# Patient Record
Sex: FEMALE | Race: WHITE | NOT HISPANIC OR LATINO | Employment: FULL TIME | URBAN - METROPOLITAN AREA
[De-identification: names, ages, dates, MRNs, and addresses within clinical notes are randomized per-mention and may not be internally consistent; named-entity substitution may affect disease eponyms.]

---

## 2017-01-06 ENCOUNTER — ANESTHESIA (OUTPATIENT)
Dept: LABOR AND DELIVERY | Facility: HOSPITAL | Age: 27
End: 2017-01-06

## 2017-01-06 ENCOUNTER — HOSPITAL ENCOUNTER (INPATIENT)
Facility: HOSPITAL | Age: 27
LOS: 2 days | Discharge: HOME | End: 2017-01-08
Attending: OBSTETRICS & GYNECOLOGY | Admitting: OBSTETRICS & GYNECOLOGY

## 2017-01-06 ENCOUNTER — ANESTHESIA EVENT (OUTPATIENT)
Dept: LABOR AND DELIVERY | Facility: HOSPITAL | Age: 27
End: 2017-01-06

## 2017-01-06 DIAGNOSIS — Z30.2 REQUEST FOR STERILIZATION: ICD-10-CM

## 2017-01-06 LAB
ABO GROUP BLD: NORMAL
ATMOSPHERIC PRESS: 758.8 MMHG
BASE EXCESS BLDCOA CALC-SCNC: -1.7 MMOL/L
BDY SITE: ABNORMAL
BLD GP AB SCN SERPL QL: NEGATIVE
DEPRECATED RDW RBC AUTO: 50.5 FL (ref 37–54)
ERYTHROCYTE [DISTWIDTH] IN BLOOD BY AUTOMATED COUNT: 16.3 % (ref 11.7–13)
GAS FLOW AIRWAY: 2 LPM
HCO3 BLDCOA-SCNC: 27.6 MMOL/L (ref 22–28)
HCT VFR BLD AUTO: 34.5 % (ref 35.6–45.5)
HGB BLD-MCNC: 11 G/DL (ref 11.9–15.5)
MCH RBC QN AUTO: 27.6 PG (ref 26.9–32)
MCHC RBC AUTO-ENTMCNC: 31.9 G/DL (ref 32.4–36.3)
MCV RBC AUTO: 86.7 FL (ref 80.5–98.2)
MODALITY: ABNORMAL
PCO2 BLDCOA: 64.5 MMHG
PH BLDCOA: 7.24 [PH] (ref 7.18–7.34)
PLATELET # BLD AUTO: 259 10*3/MM3 (ref 140–500)
PMV BLD AUTO: 9.2 FL (ref 6–12)
PO2 BLDCOA: <25.1 MMHG (ref 12–26)
RBC # BLD AUTO: 3.98 10*6/MM3 (ref 3.9–5.2)
RH BLD: POSITIVE
SAO2 % BLDCOA: 4.9 % (ref 92–99)
WBC NRBC COR # BLD: 6.93 10*3/MM3 (ref 4.5–10.7)

## 2017-01-06 PROCEDURE — 85027 COMPLETE CBC AUTOMATED: CPT | Performed by: OBSTETRICS & GYNECOLOGY

## 2017-01-06 PROCEDURE — 86901 BLOOD TYPING SEROLOGIC RH(D): CPT

## 2017-01-06 PROCEDURE — 25010000002 ONDANSETRON PER 1 MG: Performed by: ANESTHESIOLOGY

## 2017-01-06 PROCEDURE — 86900 BLOOD TYPING SEROLOGIC ABO: CPT

## 2017-01-06 PROCEDURE — 25010000003 CEFAZOLIN IN DEXTROSE 2-4 GM/100ML-% SOLUTION: Performed by: OBSTETRICS & GYNECOLOGY

## 2017-01-06 PROCEDURE — 88302 TISSUE EXAM BY PATHOLOGIST: CPT | Performed by: OBSTETRICS & GYNECOLOGY

## 2017-01-06 PROCEDURE — 94799 UNLISTED PULMONARY SVC/PX: CPT

## 2017-01-06 PROCEDURE — 25010000002 PROMETHAZINE PER 50 MG: Performed by: ANESTHESIOLOGY

## 2017-01-06 PROCEDURE — 63710000001 DIPHENHYDRAMINE PER 50 MG: Performed by: OBSTETRICS & GYNECOLOGY

## 2017-01-06 PROCEDURE — 25010000002 PHENYLEPHRINE PER 1 ML: Performed by: ANESTHESIOLOGY

## 2017-01-06 PROCEDURE — 25010000002 MORPHINE PER 10 MG: Performed by: ANESTHESIOLOGY

## 2017-01-06 PROCEDURE — 86850 RBC ANTIBODY SCREEN: CPT

## 2017-01-06 PROCEDURE — 82803 BLOOD GASES ANY COMBINATION: CPT

## 2017-01-06 RX ORDER — SIMETHICONE 80 MG
80 TABLET,CHEWABLE ORAL 4 TIMES DAILY PRN
Status: DISCONTINUED | OUTPATIENT
Start: 2017-01-06 | End: 2017-01-08 | Stop reason: HOSPADM

## 2017-01-06 RX ORDER — METHYLERGONOVINE MALEATE 0.2 MG/ML
200 INJECTION INTRAVENOUS ONCE AS NEEDED
Status: DISCONTINUED | OUTPATIENT
Start: 2017-01-06 | End: 2017-01-06 | Stop reason: HOSPADM

## 2017-01-06 RX ORDER — MAGNESIUM HYDROXIDE/ALUMINUM HYDROXICE/SIMETHICONE 120; 1200; 1200 MG/30ML; MG/30ML; MG/30ML
30 SUSPENSION ORAL EVERY 4 HOURS PRN
Status: DISCONTINUED | OUTPATIENT
Start: 2017-01-06 | End: 2017-01-08 | Stop reason: HOSPADM

## 2017-01-06 RX ORDER — BISACODYL 10 MG
10 SUPPOSITORY, RECTAL RECTAL DAILY PRN
Status: DISCONTINUED | OUTPATIENT
Start: 2017-01-06 | End: 2017-01-08 | Stop reason: HOSPADM

## 2017-01-06 RX ORDER — LANOLIN 100 %
OINTMENT (GRAM) TOPICAL
Status: DISCONTINUED | OUTPATIENT
Start: 2017-01-06 | End: 2017-01-08 | Stop reason: HOSPADM

## 2017-01-06 RX ORDER — FAMOTIDINE 10 MG/ML
20 INJECTION, SOLUTION INTRAVENOUS ONCE AS NEEDED
Status: COMPLETED | OUTPATIENT
Start: 2017-01-06 | End: 2017-01-06

## 2017-01-06 RX ORDER — IBUPROFEN 800 MG/1
800 TABLET ORAL EVERY 8 HOURS PRN
Status: DISCONTINUED | OUTPATIENT
Start: 2017-01-06 | End: 2017-01-08 | Stop reason: HOSPADM

## 2017-01-06 RX ORDER — NALOXONE HCL 0.4 MG/ML
0.4 VIAL (ML) INJECTION
Status: DISCONTINUED | OUTPATIENT
Start: 2017-01-06 | End: 2017-01-08 | Stop reason: HOSPADM

## 2017-01-06 RX ORDER — OXYTOCIN/RINGER'S LACTATE 10/500ML
125 PLASTIC BAG, INJECTION (ML) INTRAVENOUS CONTINUOUS PRN
Status: ACTIVE | OUTPATIENT
Start: 2017-01-06 | End: 2017-01-07

## 2017-01-06 RX ORDER — PROMETHAZINE HYDROCHLORIDE 25 MG/ML
12.5 INJECTION, SOLUTION INTRAMUSCULAR; INTRAVENOUS EVERY 6 HOURS PRN
Status: DISCONTINUED | OUTPATIENT
Start: 2017-01-06 | End: 2017-01-08 | Stop reason: HOSPADM

## 2017-01-06 RX ORDER — SODIUM CHLORIDE, SODIUM LACTATE, POTASSIUM CHLORIDE, CALCIUM CHLORIDE 600; 310; 30; 20 MG/100ML; MG/100ML; MG/100ML; MG/100ML
125 INJECTION, SOLUTION INTRAVENOUS CONTINUOUS
Status: DISCONTINUED | OUTPATIENT
Start: 2017-01-06 | End: 2017-01-08 | Stop reason: HOSPADM

## 2017-01-06 RX ORDER — MIRTAZAPINE 30 MG/1
30 TABLET, FILM COATED ORAL NIGHTLY
Status: DISCONTINUED | OUTPATIENT
Start: 2017-01-07 | End: 2017-01-08 | Stop reason: HOSPADM

## 2017-01-06 RX ORDER — CEFAZOLIN SODIUM 2 G/100ML
2 INJECTION, SOLUTION INTRAVENOUS ONCE
Status: COMPLETED | OUTPATIENT
Start: 2017-01-06 | End: 2017-01-06

## 2017-01-06 RX ORDER — ONDANSETRON 4 MG/1
4 TABLET, ORALLY DISINTEGRATING ORAL EVERY 6 HOURS PRN
Status: DISCONTINUED | OUTPATIENT
Start: 2017-01-06 | End: 2017-01-08 | Stop reason: HOSPADM

## 2017-01-06 RX ORDER — SODIUM CHLORIDE 0.9 % (FLUSH) 0.9 %
1-10 SYRINGE (ML) INJECTION AS NEEDED
Status: DISCONTINUED | OUTPATIENT
Start: 2017-01-06 | End: 2017-01-08 | Stop reason: HOSPADM

## 2017-01-06 RX ORDER — HYDROXYZINE 50 MG/1
50 TABLET, FILM COATED ORAL EVERY 6 HOURS PRN
Status: DISCONTINUED | OUTPATIENT
Start: 2017-01-06 | End: 2017-01-08 | Stop reason: HOSPADM

## 2017-01-06 RX ORDER — DIPHENHYDRAMINE HCL 25 MG
25 CAPSULE ORAL EVERY 4 HOURS PRN
Status: DISCONTINUED | OUTPATIENT
Start: 2017-01-06 | End: 2017-01-08 | Stop reason: HOSPADM

## 2017-01-06 RX ORDER — MORPHINE SULFATE 2 MG/ML
2 INJECTION, SOLUTION INTRAMUSCULAR; INTRAVENOUS
Status: ACTIVE | OUTPATIENT
Start: 2017-01-06 | End: 2017-01-07

## 2017-01-06 RX ORDER — CARBOPROST TROMETHAMINE 250 UG/ML
250 INJECTION, SOLUTION INTRAMUSCULAR AS NEEDED
Status: DISCONTINUED | OUTPATIENT
Start: 2017-01-06 | End: 2017-01-06 | Stop reason: HOSPADM

## 2017-01-06 RX ORDER — ONDANSETRON 2 MG/ML
4 INJECTION INTRAMUSCULAR; INTRAVENOUS EVERY 6 HOURS PRN
Status: DISCONTINUED | OUTPATIENT
Start: 2017-01-06 | End: 2017-01-08 | Stop reason: HOSPADM

## 2017-01-06 RX ORDER — OXYTOCIN/RINGER'S LACTATE 10/500ML
PLASTIC BAG, INJECTION (ML) INTRAVENOUS AS NEEDED
Status: DISCONTINUED | OUTPATIENT
Start: 2017-01-06 | End: 2017-01-06 | Stop reason: SURG

## 2017-01-06 RX ORDER — ONDANSETRON 2 MG/ML
4 INJECTION INTRAMUSCULAR; INTRAVENOUS ONCE AS NEEDED
Status: DISCONTINUED | OUTPATIENT
Start: 2017-01-06 | End: 2017-01-08 | Stop reason: HOSPADM

## 2017-01-06 RX ORDER — BUPIVACAINE HYDROCHLORIDE 7.5 MG/ML
INJECTION, SOLUTION EPIDURAL; RETROBULBAR AS NEEDED
Status: DISCONTINUED | OUTPATIENT
Start: 2017-01-06 | End: 2017-01-06 | Stop reason: SURG

## 2017-01-06 RX ORDER — ONDANSETRON 4 MG/1
4 TABLET, FILM COATED ORAL EVERY 6 HOURS PRN
Status: DISCONTINUED | OUTPATIENT
Start: 2017-01-06 | End: 2017-01-08 | Stop reason: HOSPADM

## 2017-01-06 RX ORDER — DIPHENHYDRAMINE HYDROCHLORIDE 50 MG/ML
25 INJECTION INTRAMUSCULAR; INTRAVENOUS EVERY 4 HOURS PRN
Status: DISCONTINUED | OUTPATIENT
Start: 2017-01-06 | End: 2017-01-08 | Stop reason: HOSPADM

## 2017-01-06 RX ORDER — ONDANSETRON 2 MG/ML
4 INJECTION INTRAMUSCULAR; INTRAVENOUS ONCE AS NEEDED
Status: COMPLETED | OUTPATIENT
Start: 2017-01-06 | End: 2017-01-06

## 2017-01-06 RX ORDER — NALOXONE HCL 0.4 MG/ML
0.2 VIAL (ML) INJECTION
Status: DISCONTINUED | OUTPATIENT
Start: 2017-01-06 | End: 2017-01-08 | Stop reason: HOSPADM

## 2017-01-06 RX ORDER — PROMETHAZINE HYDROCHLORIDE 25 MG/ML
INJECTION, SOLUTION INTRAMUSCULAR; INTRAVENOUS AS NEEDED
Status: DISCONTINUED | OUTPATIENT
Start: 2017-01-06 | End: 2017-01-06 | Stop reason: SURG

## 2017-01-06 RX ORDER — MISOPROSTOL 200 UG/1
800 TABLET ORAL AS NEEDED
Status: DISCONTINUED | OUTPATIENT
Start: 2017-01-06 | End: 2017-01-06 | Stop reason: HOSPADM

## 2017-01-06 RX ORDER — OXYCODONE HYDROCHLORIDE AND ACETAMINOPHEN 5; 325 MG/1; MG/1
1 TABLET ORAL EVERY 4 HOURS PRN
Status: DISCONTINUED | OUTPATIENT
Start: 2017-01-06 | End: 2017-01-08 | Stop reason: HOSPADM

## 2017-01-06 RX ORDER — OXYTOCIN/RINGER'S LACTATE 10/500ML
999 PLASTIC BAG, INJECTION (ML) INTRAVENOUS ONCE
Status: DISCONTINUED | OUTPATIENT
Start: 2017-01-06 | End: 2017-01-08 | Stop reason: HOSPADM

## 2017-01-06 RX ORDER — OXYTOCIN/RINGER'S LACTATE 10/500ML
999 PLASTIC BAG, INJECTION (ML) INTRAVENOUS ONCE
Status: DISCONTINUED | OUTPATIENT
Start: 2017-01-06 | End: 2017-01-06 | Stop reason: HOSPADM

## 2017-01-06 RX ORDER — MORPHINE SULFATE 2 MG/ML
2 INJECTION, SOLUTION INTRAMUSCULAR; INTRAVENOUS EVERY 4 HOURS PRN
Status: DISCONTINUED | OUTPATIENT
Start: 2017-01-06 | End: 2017-01-08 | Stop reason: HOSPADM

## 2017-01-06 RX ORDER — FAMOTIDINE 20 MG/1
20 TABLET, FILM COATED ORAL DAILY
Status: DISCONTINUED | OUTPATIENT
Start: 2017-01-07 | End: 2017-01-08 | Stop reason: HOSPADM

## 2017-01-06 RX ORDER — OXYTOCIN/RINGER'S LACTATE 10/500ML
125 PLASTIC BAG, INJECTION (ML) INTRAVENOUS CONTINUOUS PRN
Status: DISCONTINUED | OUTPATIENT
Start: 2017-01-06 | End: 2017-01-06 | Stop reason: HOSPADM

## 2017-01-06 RX ORDER — PROMETHAZINE HYDROCHLORIDE 25 MG/1
25 TABLET ORAL EVERY 6 HOURS PRN
Status: DISCONTINUED | OUTPATIENT
Start: 2017-01-06 | End: 2017-01-08 | Stop reason: HOSPADM

## 2017-01-06 RX ORDER — ACETAMINOPHEN 500 MG
1000 TABLET ORAL ONCE
Status: COMPLETED | OUTPATIENT
Start: 2017-01-06 | End: 2017-01-06

## 2017-01-06 RX ORDER — ACETAMINOPHEN 325 MG/1
650 TABLET ORAL EVERY 4 HOURS PRN
Status: DISCONTINUED | OUTPATIENT
Start: 2017-01-06 | End: 2017-01-08 | Stop reason: HOSPADM

## 2017-01-06 RX ORDER — MISOPROSTOL 200 UG/1
600 TABLET ORAL ONCE AS NEEDED
Status: DISCONTINUED | OUTPATIENT
Start: 2017-01-06 | End: 2017-01-08 | Stop reason: HOSPADM

## 2017-01-06 RX ORDER — OXYCODONE AND ACETAMINOPHEN 7.5; 325 MG/1; MG/1
1 TABLET ORAL EVERY 4 HOURS PRN
Status: DISCONTINUED | OUTPATIENT
Start: 2017-01-06 | End: 2017-01-08 | Stop reason: HOSPADM

## 2017-01-06 RX ORDER — HYDROMORPHONE HYDROCHLORIDE 1 MG/ML
0.25 INJECTION, SOLUTION INTRAMUSCULAR; INTRAVENOUS; SUBCUTANEOUS
Status: DISCONTINUED | OUTPATIENT
Start: 2017-01-06 | End: 2017-01-06 | Stop reason: HOSPADM

## 2017-01-06 RX ORDER — DOCUSATE SODIUM 100 MG/1
100 CAPSULE, LIQUID FILLED ORAL 2 TIMES DAILY PRN
Status: DISCONTINUED | OUTPATIENT
Start: 2017-01-06 | End: 2017-01-08 | Stop reason: HOSPADM

## 2017-01-06 RX ORDER — MORPHINE SULFATE 1 MG/ML
INJECTION, SOLUTION EPIDURAL; INTRATHECAL; INTRAVENOUS AS NEEDED
Status: DISCONTINUED | OUTPATIENT
Start: 2017-01-06 | End: 2017-01-06 | Stop reason: SURG

## 2017-01-06 RX ORDER — PROMETHAZINE HYDROCHLORIDE 12.5 MG/1
12.5 SUPPOSITORY RECTAL EVERY 6 HOURS PRN
Status: DISCONTINUED | OUTPATIENT
Start: 2017-01-06 | End: 2017-01-08 | Stop reason: HOSPADM

## 2017-01-06 RX ADMIN — PROMETHAZINE HYDROCHLORIDE 6.25 MG: 25 INJECTION INTRAMUSCULAR; INTRAVENOUS at 14:07

## 2017-01-06 RX ADMIN — OXYCODONE HYDROCHLORIDE AND ACETAMINOPHEN 1 TABLET: 5; 325 TABLET ORAL at 17:51

## 2017-01-06 RX ADMIN — OXYCODONE HYDROCHLORIDE AND ACETAMINOPHEN 1 TABLET: 5; 325 TABLET ORAL at 22:43

## 2017-01-06 RX ADMIN — PHENYLEPHRINE HYDROCHLORIDE 100 MCG: 10 INJECTION INTRAVENOUS at 13:58

## 2017-01-06 RX ADMIN — EPHEDRINE SULFATE 10 MG: 50 INJECTION INTRAMUSCULAR; INTRAVENOUS; SUBCUTANEOUS at 13:46

## 2017-01-06 RX ADMIN — CEFAZOLIN SODIUM 2 G: 2 INJECTION, SOLUTION INTRAVENOUS at 13:15

## 2017-01-06 RX ADMIN — PHENYLEPHRINE HYDROCHLORIDE 100 MCG: 10 INJECTION INTRAVENOUS at 13:45

## 2017-01-06 RX ADMIN — PROMETHAZINE HYDROCHLORIDE 6.25 MG: 25 INJECTION INTRAMUSCULAR; INTRAVENOUS at 14:03

## 2017-01-06 RX ADMIN — PHENYLEPHRINE HYDROCHLORIDE 100 MCG: 10 INJECTION INTRAVENOUS at 14:06

## 2017-01-06 RX ADMIN — FAMOTIDINE 20 MG: 10 INJECTION, SOLUTION INTRAVENOUS at 13:05

## 2017-01-06 RX ADMIN — BUPIVACAINE HYDROCHLORIDE 1.6 ML: 7.5 INJECTION, SOLUTION EPIDURAL; RETROBULBAR at 13:35

## 2017-01-06 RX ADMIN — ONDANSETRON 4 MG: 2 INJECTION INTRAMUSCULAR; INTRAVENOUS at 13:04

## 2017-01-06 RX ADMIN — OXYTOCIN 500 ML: 10 INJECTION, SOLUTION INTRAMUSCULAR; INTRAVENOUS at 13:49

## 2017-01-06 RX ADMIN — SODIUM CHLORIDE, POTASSIUM CHLORIDE, SODIUM LACTATE AND CALCIUM CHLORIDE: 600; 310; 30; 20 INJECTION, SOLUTION INTRAVENOUS at 13:27

## 2017-01-06 RX ADMIN — OXYTOCIN 125 ML/HR: 10 INJECTION, SOLUTION INTRAMUSCULAR; INTRAVENOUS at 14:45

## 2017-01-06 RX ADMIN — MORPHINE SULFATE 0.2 MG: 1 INJECTION EPIDURAL; INTRATHECAL; INTRAVENOUS at 13:35

## 2017-01-06 RX ADMIN — HYDROXYZINE HYDROCHLORIDE 50 MG: 50 TABLET, FILM COATED ORAL at 20:07

## 2017-01-06 RX ADMIN — IBUPROFEN 800 MG: 800 TABLET ORAL at 16:26

## 2017-01-06 RX ADMIN — ACETAMINOPHEN 500 MG TABLET 1000 MG: at 13:07

## 2017-01-06 RX ADMIN — DIPHENHYDRAMINE HYDROCHLORIDE 25 MG: 25 CAPSULE ORAL at 16:22
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
## 2017-01-06 NOTE — ANESTHESIA PREPROCEDURE EVALUATION
Anesthesia Evaluation     Patient summary reviewed and Nursing notes reviewed    No history of anesthetic complications   Airway   Mallampati: II  TM distance: >3 FB  Neck ROM: full  no difficulty expected  Dental - normal exam     Pulmonary - normal exam    breath sounds clear to auscultation  (+) hx of smoking,   Cardiovascular - negative cardio ROS and normal exam    Rhythm: regular  Rate: normal    Neuro/Psych  (+) headaches, psychiatric history Bipolar,    GI/Hepatic/Renal/Endo - negative ROS     Musculoskeletal (-) negative ROS    Abdominal  - normal exam   Substance History - negative use     OB/GYN    (+) Pregnant,         Other - negative ROS                            Anesthesia Plan    ASA 2     spinal     Anesthetic plan and risks discussed with patient.
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
## 2017-01-06 NOTE — PLAN OF CARE
Problem: Patient Care Overview (Adult)  Goal: Plan of Care Review  Outcome: Ongoing (interventions implemented as appropriate)    17 1523   Coping/Psychosocial Response Interventions   Plan Of Care Reviewed With patient;spouse   Patient Care Overview   Progress progress toward functional goals as expected       Goal: Adult Individualization and Mutuality  Outcome: Ongoing (interventions implemented as appropriate)    17 1523   Individualization   Patient Specific Preferences Primary  for HSV   Patient Specific Goals Pain management    Mutuality/Individual Preferences   What Anxieties, Fears or Concerns Do You Have About Your Health or Care? Spinal       Goal: Discharge Needs Assessment  Outcome: Ongoing (interventions implemented as appropriate)    17 1523   Discharge Needs Assessment   Concerns To Be Addressed no discharge needs identified         Problem:  Delivery (Adult,Obstetrics,Pediatric)  Goal: Signs and Symptoms of Listed Potential Problems Will be Absent or Manageable ( Delivery)  Outcome: Outcome(s) achieved Date Met:  17 1523    Delivery   Problems Assessed ( Delivery) all   Problems Present ( Delivery) fetal well-being change

## 2017-01-06 NOTE — ANESTHESIA POSTPROCEDURE EVALUATION
Patient: Rema Tee    Procedure Summary     Date Anesthesia Start Anesthesia Stop Room / Location    17 1321 [id number]  [facility] /  [facility]       Procedure Diagnosis Surgeon Provider     SECTION PRIMARY WITH TUBAL (N/A Abdomen) (pregnancy) MD Christiano Laurent MD          Anesthesia Type: spinal  Last vitals  BP 96/56 (17)    Temp 36 °C (96.8 °F) (17)    Pulse 76 (17)   Resp 16 (17)    SpO2 98 % (17)      Post Anesthesia Care and Evaluation    Patient location during evaluation: bedside  Patient participation: complete - patient participated  Level of consciousness: awake and alert  Pain management: adequate  Airway patency: patent  Anesthetic complications: No anesthetic complications  Cardiovascular status: acceptable  Respiratory status: acceptable  Hydration status: acceptable

## 2017-01-06 NOTE — OP NOTE
[facility]   Section Operative Note    Patient Name: Rema Tee  :  1990  MRN:  [id number]      Date of procedure:  2017        Pre-Operative Dx:   1.  IUP at 38w0d weeks   2. Other (Add Comments)         Postoperative Dx:  Same        Procedure: , Low Transverse    Surgeon: Jaylin Laureano MD   Assistant: Christoph Hanna MD   Anesthesia: Spinal    EBL: 800 ml               Findings:                           Amniotic Fluid clear  Uterus normal  Tubes and ovaries normal bilaterally              Infant:      Infant's Name: erinn     Gender: Viable female  infant     Weight: 6 lb 7.5 oz (2.935 kg)     Apgars: 8   @ 1 minute /     9   @ 5 minutes                 Indication for C/Section:     Other (Add Comments)      HSV          Procedure Details:  The patient was taken to the operating room where spinal anesthesia was found to be adequate. She was prepped and draped in the usual sterile manner in the dorsal supine position with leftward tilt. A Pfannenstiel incision was made and carried down to the fascia. The fascia was incised in the mid-line and extended transversely with Gonzalez scissors. The fascia was grasped and elevated and  from the underlying rectus muscles superiorly and inferiorly. The peritoneum was identified and entered. Peritoneal incision was extended superiorly and inferiorly with good visualization of the bladder. The bladder blade was inserted and the vesicouterine peritoneum was incised transversely and the bladder flap was created digitally. The bladder blade was reinserted. The  lower uterine segment was incised in a transverse fashion.  The fetal head was elevated and delivered through the incision. The remainder of the infant was delivered without difficulty. The umbilical cord was clamped and cut and the infant was handed off the field. Cord ph and cord bloods were obtained. The placenta was removed intact and appeared normal. The uterine incision  was closed with running locked sutures of 0 chromic. Second layer closure was placed imbricating the first for hemostasis. The abdominal cavity was irrigated and cleared of all clot and debris. The uterine incision was inspected and noted to be hemostatic. Rectus muscles were reapproximated in the midline with 0 chromic.  The fascia was closed with 0 PDS in running continuous fashion. The subcutaneous tissue was closed with 3-0 vicryl. The skin was closed in subcuticular fashion with 4-0 Vicryl.   Instrument, sponge, and needle counts were correct prior the abdominal closure and at the conclusion of the case. Mother and baby  to recovery room in stable condition.              Complications:     None          Antibiotics: cefazolin (Ancef)                      Jaylin Laureano MD  1/6/2017  2:47 PM    There was a true knot in the cord.  Jaylin Laureano MD

## 2017-01-06 NOTE — ANESTHESIA PROCEDURE NOTES
Intrathecal Block    Patient location during procedure: OB  Start Time: 1/6/2017 1:34 PM  Indication:at surgeon's request  Performed By  Anesthesiologist: FAWAD MAJOR  Intrathecal Block Prep:  Pt Position:sitting  Sterile Tech:cap, gloves, mask and sterile barrier  Intrathecal Block Procedure:  Approach:midline  Guidance:landmark technique  Location:L3-L4  Needle Type:Sprotte  Needle Gauge:24 G  Placement of Needle Event: cerebrospinal fluid  Fluid Appearance:clear  Post Assessment:  Patient Tolerance:patient tolerated the procedure well with no apparent complications  Complications:no

## 2017-01-06 NOTE — IP AVS SNAPSHOT
AFTER VISIT SUMMARY             Rema Tee           About your hospitalization     You were admitted on:  2017 You last received care in the:  [address]       Procedures & Surgeries      Procedure(s) (LRB):   SECTION PRIMARY WITH TUBAL (N/A)     2017     Surgeon(s):  Jaylin Laureano MD  -------------------      Medications    If you or your caregiver advised us that you are currently taking a medication and that medication is marked below as “Resume”, this simply indicates that we have reviewed those medications to make sure our new therapy recommendations do not interfere.  If you have concerns about medications other than those new ones which we are prescribing today, please consult the physician who prescribed them (or your primary physician).  Our review of your home medications is not meant to indicate that we are directing their use.             Your Medications      START taking these medications     ibuprofen 800 MG tablet   Take 1 tablet by mouth Every 8 (Eight) Hours As Needed for mild pain (1-3).   Last time this was given:  2017 11:08 AM   Commonly known as:  ADVIL,MOTRIN             CHANGE how you take these medications     oxyCODONE-acetaminophen 5-325 MG per tablet   Take 1 tablet by mouth Every 4 (Four) Hours As Needed for moderate pain (4-6).   Last time this was given:  2017 12:44 PM   Commonly known as:  PERCOCET   What changed:  Another medication with the same name was added. Make sure you understand how and when to take each.           oxyCODONE-acetaminophen 5-325 MG per tablet   Take 1 tablet by mouth Every 4 (Four) Hours As Needed for moderate pain (4-6) for up to 8 days.   Last time this was given:  2017 12:44 PM   Commonly known as:  PERCOCET   What changed:  You were already taking a medication with the same name, and this prescription was added. Make sure you understand how and when to take each.             CONTINUE  taking these medications     famotidine 20 MG tablet   Take 1 tablet by mouth Daily.   Commonly known as:  PEPCID           prenatal vitamin 27-0.8 27-0.8 MG tablet tablet   Take  by mouth daily.           promethazine 25 MG tablet   Take 1 tablet by mouth every 6 (six) hours as needed for nausea or vomiting for up to 30 doses.   Commonly known as:  PHENERGAN           REMERON PO   Take 30 mg by mouth Daily.   Last time this was given:  1/7/2017  8:21 PM           VYVANSE 50 MG capsule   Take 50 mg by mouth Every Morning.   Generic drug:  lisdexamfetamine             STOP taking these medications     valACYclovir 1000 MG tablet   Commonly known as:  VALTREX                Where to Get Your Medications      These medications were sent to [facility] [address] [place], [place] [address] [facility] AT [facility] - [phone] [facility] [phone] [address] [address]    Hours:  24-hours Phone:  [phone]     ibuprofen 800 MG tablet         You can get these medications from any pharmacy     Bring a paper prescription for each of these medications     oxyCODONE-acetaminophen 5-325 MG per tablet                  Your Medications      Your Medication List           Morning Noon Evening Bedtime As Needed    famotidine 20 MG tablet   Take 1 tablet by mouth Daily.   Commonly known as:  PEPCID                                ibuprofen 800 MG tablet   Take 1 tablet by mouth Every 8 (Eight) Hours As Needed for mild pain (1-3).   Commonly known as:  ADVIL,MOTRIN                                * oxyCODONE-acetaminophen 5-325 MG per tablet   Take 1 tablet by mouth Every 4 (Four) Hours As Needed for moderate pain (4-6).   Commonly known as:  PERCOCET                                * oxyCODONE-acetaminophen 5-325 MG per tablet   Take 1 tablet by mouth Every 4 (Four) Hours As Needed for moderate pain (4-6) for up to 8 days.   Commonly known as:  PERCOCET                                prenatal  vitamin 27-0.8 27-0.8 MG tablet tablet   Take  by mouth daily.                                promethazine 25 MG tablet   Take 1 tablet by mouth every 6 (six) hours as needed for nausea or vomiting for up to 30 doses.   Commonly known as:  PHENERGAN                                REMERON PO   Take 30 mg by mouth Daily.                                VYVANSE 50 MG capsule   Take 50 mg by mouth Every Morning.   Generic drug:  lisdexamfetamine                                * Notice:  This list has 2 medication(s) that are the same as other medications prescribed for you. Read the directions carefully, and ask your doctor or other care provider to review them with you.             Instructions for After Discharge        Discharge References/Attachments     POSTPARTUM CARE AFTER  DELIVERY (ENGLISH)    WEANING (ENGLISH)    EXCLUSIVE BREASTFEEDING (ENGLISH)    BREASTFEEDING (ENGLISH)    BREAST ENGORGEMENT (ENGLISH)    BREASTFEEDING AND MASTITIS (ENGLISH)    ACETAMINOPHEN; OXYCODONE TABLETS (ENGLISH)    IBUPROFEN TABLETS AND CAPSULES (ENGLISH)    PRENATAL VITAMIN AND MINERAL COMBINATIONS (ORAL SOLID DOSAGE FORMS) (ENGLISH)    MIRTAZAPINE TABLETS (ENGLISH)       Follow-ups for After Discharge        [facility] [employer] Signup     Our records indicate that you have an active [employer] account.    You can view your After Visit Summary by going to [employer] and logging in with your [employer] username and password.  If you don't have a [employer] username and password but a parent or guardian has access to your record, the parent or guardian should login with their own [employer] username and password and access your record to view the After Visit Summary.    If you have questions, you can email [email or web address] or call [phone] to talk to our [employer] staff.  Remember, [employer] is NOT to be used for urgent needs.  For medical emergencies, dial 911.           Summary of Your Hospitalization         Reason for Hospitalization     Your primary diagnosis was:  Not on File    Your diagnoses also included:  Pregnancy      Care Providers     Provider Service Role Specialty    Jaylin Laureano MD -- Attending Provider Obstetrics and Gynecology    Laura Perdue MD -- Surgeon  Obstetrics and Gynecology    Jaylin Laureano MD -- Surgeon  Obstetrics and Gynecology      Your Allergies  Date Reviewed: 2017    No active allergies      Pending Labs     Order Current Status    Tissue Exam In process      Patient Belongings Returned     Document Return of Belongings Flowsheet     Were the patient bedside belongings sent home?   --   Belongings Retrieved from Security & Sent Home   --    Belongings Sent to Safe   --   Medications Retrieved from Pharmacy & Sent Home   --              More Information      Postpartum Care After  Delivery  After you deliver your  (postpartum period), the usual stay in the hospital is 24-72 hours. If there were problems with your labor or delivery, or if you have other medical problems, you might be in the hospital longer.   While you are in the hospital, you will receive help and instructions on how to care for yourself and your  during the postpartum period.   While you are in the hospital:  · It is normal for you to have pain or discomfort from the incision in your abdomen. Be sure to tell your nurses when you are having pain, where the pain is located, and what makes the pain worse.  · If you are breastfeeding, you may feel uncomfortable contractions of your uterus for a couple of weeks. This is normal. The contractions help your uterus get back to normal size.  · It is normal to have some bleeding after delivery.    For the first 1-3 days after delivery, the flow is red and the amount may be similar to a period.    It is common for the flow to start and stop.    In the first few days, you may pass some small clots. Let your nurses know if you begin to pass large  clots or your flow increases.    Do not  flush blood clots down the toilet before having the nurse look at them.    During the next 3-10 days after delivery, your flow should become more watery and pink or brown-tinged in color.    Ten to fourteen days after delivery, your flow should be a small amount of yellowish-white discharge.    The amount of your flow will decrease over the first few weeks after delivery. Your flow may stop in 6-8 weeks. Most women have had their flow stop by 12 weeks after delivery.  · You should change your sanitary pads frequently.  · Wash your hands thoroughly with soap and water for at least 20 seconds after changing pads, using the toilet, or before holding or feeding your .  · Your intravenous (IV) tubing will be removed when you are drinking enough fluids.  · The urine drainage tube (urinary catheter) that was inserted before delivery may be removed within 6-8 hours after delivery or when feeling returns to your legs. You should feel like you need to empty your bladder within the first 6-8 hours after the catheter has been removed.  · In case you become weak, lightheaded, or faint, call your nurse before you get out of bed for the first time and before you take a shower for the first time.  · Within the first few days after delivery, your breasts may begin to feel tender and full. This is called engorgement. Breast tenderness usually goes away within 48-72 hours after engorgement occurs. You may also notice milk leaking from your breasts. If you are not breastfeeding, do not stimulate your breasts. Breast stimulation can make your breasts produce more milk.  · Spending as much time as possible with your  is very important. During this time, you and your  can feel close and get to know each other. Having your  stay in your room (rooming in) will help to strengthen the bond with your . It will give you time to get to know your  and become  comfortable caring for your .  · Your hormones change after delivery. Sometimes the hormone changes can temporarily cause you to feel sad or tearful. These feelings should not last more than a few days. If these feelings last longer than that, you should talk to your caregiver.  · If desired, talk to your caregiver about methods of family planning or contraception.  · Talk to your caregiver about immunizations. Your caregiver may want you to have the following immunizations before leaving the hospital:    Tetanus, diphtheria, and pertussis (Tdap) or tetanus and diphtheria (Td) immunization. It is very important that you and your family (including grandparents) or others caring for your  are up-to-date with the Tdap or Td immunizations. The Tdap or Td immunization can help protect your  from getting ill.    Rubella immunization.    Varicella (chickenpox) immunization.    Influenza immunization. You should receive this annual immunization if you did not receive the immunization during your pregnancy.     This information is not intended to replace advice given to you by your health care provider. Make sure you discuss any questions you have with your health care provider.     Document Released: 2013 Document Reviewed: 2013  [employer] Interactive Patient Education ©6 [employer]          Weaning  When you stop breastfeeding, it is called weaning. This can be a natural process that takes place on its own over time. There may be a reason you need to stop breastfeeding before it can happen naturally (such as you may need to go back to work, be away from home on a trip, or you feel it is the right time). If possible, wait until your baby is at least 6 months old. With a little time and preparation, weaning can be a positive experience.   WHEN IS THE BEST TIME TO STOP BREASTFEEDING?  There is no right or wrong answer. What is best for you and your child may be different from what is best  for other mothers and their children. It is recommended to:  · Feed your baby only breast milk for the first 6 months.  · Feed your baby both breast milk and solid food for another 6 months.  · Continue to breastfeed your baby as long as both you and your child desire after 12 months.  HOW DO I START WEANING?  Wean gradually over several weeks. Some guidelines to follow are:  · Start by introducing iron-fortified, solid food in addition to breast milk.  · Encourage your baby to try different feeding methods.  · Teach your baby to drink from a cup. Try putting pre-pumped (expressed) breast milk in the cup.  · If your baby will not use a cup, try offering a bottle.  · It may be easier and less confusing for your baby if someone else offers the first cup or bottle feeding.  · When cup or bottle feeding is successful, and your baby is getting enough nutrition that way, you can substitute a cup feeding for a breastfeeding session.  · You can eventually substitute a cup feeding for another breastfeeding session, especially if your baby will drink something besides your expressed breast milk.  · Continue replacing one more breastfeeding session every few days.  · Your breasts may feel full and uncomfortable at times during weaning. Express just a small amount of milk for relief.  HOW DOES BREASTFEEDING STOP NATURALLY?  Children may start to wean themselves at about 6 months. This may happen when:  · You introduce solid food. Your baby may still prefer to nurse in order to get fluids.  · Your baby is better able to drink from a cup. This may prompt your baby to breastfeed less often.  · Your baby gradually becomes less interested in breastfeeding as he or she gets used to drinking other fluids.  · Your baby slowly starts to drop one breastfeeding session every 2-3 days.  WHEN SHOULD I GET HELP WITH WEANING?  Your health care provider or a lactation consultant can help you during the weaning process. They are good resources  to ask which foods are best to introduce first and which fluids you can start substituting for breast milk. They can also help if you are having any problems related to weaning.  WHEN SHOULD I CONTACT MY HEALTH CARE PROVIDER?  You should contact your health care provider if:  · Your baby is not gaining weight.  · You baby suddenly stops nursing.  · One or both breasts become firm and painful.     This information is not intended to replace advice given to you by your health care provider. Make sure you discuss any questions you have with your health care provider.     Document Released: 04/18/2006 Document Revised: 12/23/2014 Document Reviewed: 10/14/2014  [employer] Interactive Patient Education ©2016 [employer]          Exclusive Breastfeeding  Deciding to breastfeed is one of the best choices you can make for you and your baby. Breast milk has all the nutrients that your baby needs, when your baby needs them. That is why many women who can choose to breastfeed exclusively.  A change in hormones during pregnancy causes your breast tissue to grow and increases the number and size of your milk ducts. These hormones also allow proteins, sugars, and fats from your blood supply to make breast milk in your milk-producing glands. Hormones prevent breast milk from being released before your baby is born as well as prompt milk flow after birth. Once breastfeeding has begun, thoughts of your baby, as well as his or her sucking or crying, can stimulate the release of milk from your milk-producing glands.   BENEFITS OF BREASTFEEDING  For Your Baby  · Your first milk (colostrum) helps your baby's digestive system function better.  · There are antibodies in your milk that help your baby fight off infections.  · Your baby has a lower incidence of asthma, allergies, and sudden infant death syndrome.  · The nutrients in breast milk are better for your baby than infant formulas and are designed uniquely for your baby's  needs.  · Breast milk improves your baby's brain development.  · Your baby is less likely to develop other conditions, such as childhood obesity, asthma, or type 2 diabetes mellitus.  For You   · Breastfeeding helps to create a very special bond between you and your baby.  · Breastfeeding is convenient. Breast milk is always available at the correct temperature and costs nothing.  · Breastfeeding helps to burn calories and helps you lose the weight gained during pregnancy.  · Breastfeeding makes your uterus contract to its prepregnancy size faster and slows bleeding (lochia) after you give birth.  · Breastfeeding helps to lower your risk of developing type 2 diabetes mellitus, osteoporosis, and breast or ovarian cancer later in life.  BENEFITS OF EXCLUSIVE BREASTFEEDING  · The more often your baby breastfeeds, the more milk you will produce. By exclusively breastfeeding, you help to ensure that your baby has an ample supply of milk. Supplementing with water or infant formula when your baby would otherwise be breastfeeding may send a message to your body that less milk is needed. Your baby may become full from the supplements and breastfeed less. This can interfere with the establishment of your milk supply.  · Exclusive breastfeeding helps to develop your baby's immune system in a healthy way. Your breast milk contains antibodies your baby needs to to fight off germs that cause infections.  babies are also less likely to develop food allergies. When your baby is introduced to foods other than breast milk too soon, his or her body is more likely to interpret that food as an allergen, making antibodies against it. This is what causes food allergies. If your baby breastfeeds less because he or she is also receiving supplements, he or she may have a harder time fighting infections or be more likely to develop food allergies.  SIGNS THAT YOUR BABY IS HUNGRY  Early Signs of Hunger   · Increased alertness or  activity.  · Stretching.  · Movement of the head from side to side.  · Movement of the head and opening of the mouth when the corner of the mouth or cheek is stroked (rooting).  · Increased sucking sounds, smacking lips, cooing, sighing, or squeaking.  · Hand-to-mouth movements.  · Increased sucking of fingers or hands.  Late Signs of Hunger  · Fussing.  · Intermittent crying.  Extreme Signs of Hunger  Signs of extreme hunger will require calming and consoling before your baby will be able to breastfeed successfully. Do not wait for the following signs of extreme hunger to occur before you initiate breastfeeding:   · Restlessness.  · A loud, strong cry.  · Screaming.  BREASTFEEDING BASICS  Breastfeeding Initiation  · Find a comfortable place to sit or lie down, with your neck and back well supported.  · Place a pillow or rolled up blanket under your baby to bring him or her to the level of your breast (if you are seated). Nursing pillows are specially designed to help support your arms and your baby while you breastfeed.  · Make sure that your baby's abdomen is facing your abdomen.  · Gently massage your breast. With your fingertips, massage from your chest wall toward your nipple in a circular motion. This encourages milk flow. You may need to continue this action during the feeding if your milk flows slowly.  · Support your breast with 4 fingers underneath and your thumb above your nipple. Make sure your fingers are well away from your nipple and your baby's mouth.  · Stroke your baby's lips gently with your finger or nipple.  · When your baby's mouth is open wide enough, quickly bring your baby to your breast, placing your entire nipple and as much of the colored area around your nipple (areola) as possible into your baby's mouth.  ¨ More areola should be visible above your baby's upper lip than below the lower lip.  ¨ Your baby's tongue should be between his or her lower gum and your breast.  · Ensure that your  baby's mouth is correctly positioned around your nipple (latched). Your baby's lips should create a seal on your breast and be turned out (everted).  · It is common for your baby to suck for about 2-3 minutes in order to start the flow of breast milk.  Latching  Teaching your baby how to latch on to your breast properly is very important. An improper latch can cause nipple pain and decreased milk supply for you and poor weight gain in your baby. Also, if your baby is not latched onto your nipple properly, he or she may swallow some air during feeding. This can make your baby fussy. Burping your baby when you switch breasts during the feeding can help to get rid of the air. However, teaching your baby to latch on properly is still the best way to prevent your baby from swallowing air while breastfeeding.  Signs that your baby has successfully latched on to your nipple:   · Silent tugging or silent sucking, without causing you pain.  · Swallowing heard between every 3-4 sucks.  · Muscle movement above and in front of his or her ears while sucking.  Signs that your baby has not successfully latched on to your nipple:   · Sucking sounds or smacking sounds from your baby while breastfeeding.  · Nipple pain.  If you think your baby has not latched on correctly, slip your finger into the corner of your baby's mouth to break the suction and place it between your baby's gums. Attempt breastfeeding initiation again.  Signs of Successful Breastfeeding  Signs from your baby:   · A gradual decrease in the number of sucks or complete cessation of sucking.  · Falling asleep.  · Relaxation of his or her body.  · Retention of a small amount of milk in his or her mouth.  · Letting go of your breast by himself or herself.  Signs from you:  · Breasts that have increased in firmness, weight, and size 1-3 hours after feeding.  · Breasts that are softer immediately after breastfeeding.  · Increased milk volume, as well as a change in milk  "consistency and color by the 5th day of breastfeeding.  · Nipples that are not sore, cracked, or bleeding.  Signs That Your Baby is Getting Enough Milk  · Wetting at least 3 diapers in a 24-hour period. The urine should be clear and pale yellow by age 5 days.  · At least 3 stools in a 24-hour period by age 5 days. The stool should be soft and yellow.  · At least 3 stools in a 24-hour period by age 7 days. The stool should be seedy and yellow.  · No loss of weight greater than 10% of birth weight during the first 3 days of age.  · Average weight gain of 4-7 ounces (120-210 mL) per week after age 4 days.  · Consistent daily weight gain by age 5 days, without weight loss after the age of 2 weeks.  After a feeding, your baby may spit up a small amount. This is common.  BREASTFEEDING FREQUENCY AND DURATION  Frequent feeding will help you make more milk and can prevent sore nipples and breast engorgement. Breastfeed when you feel the need to reduce the fullness of your breasts or when your baby shows signs of hunger. This is called \"breastfeeding on demand.\" Avoid introducing a pacifier to your baby while you are working to establish breastfeeding (the first 4-6 weeks after your baby is born). After this time you may choose to use a pacifier. Research has shown that pacifier use during the first year of a baby's life decreases the risk of sudden infant death syndrome (SIDS).  Allow your baby to feed on each breast as long as he or she wants. Breastfeed until your baby is finished feeding. When your baby unlatches or falls asleep while feeding from the first breast, offer the second breast. Because newborns are often sleepy in the first few weeks of life, you may need to awaken your baby to get him or her to feed.  Breastfeeding times will vary from baby to baby. However, the following rules can serve as a guide to help you ensure that your baby is properly fed:  · Newborns (babies 4 weeks of age or younger) may breastfeed " every 1-3 hours.  · Newborns should not go longer than 3 hours during the day or 5 hours during the night without breastfeeding.  · You should breastfeed your baby a minimum of 8 times in a 24-hour period until you begin to introduce solid foods to your baby at around 6 months of age.  BREAST MILK PUMPING  Pumping and storing breast milk allows you to ensure that your baby is exclusively fed your breast milk, even at times when you are unable to breastfeed. This is especially important if you are going back to work while you are still breastfeeding or when you are not able to be present during feedings. Your lactation consultant can give you guidelines on how long it is safe to store breast milk.   A breast pump is a machine that allows you to pump milk from your breast into a sterile bottle. The pumped breast milk can then be stored in a refrigerator or freezer. Some breast pumps are operated by hand, while others use electricity. Ask your lactation consultant which type will work best for you. Breast pumps can be purchased, but some hospitals and breastfeeding support groups lease breast pumps on a monthly basis. A lactation consultant can teach you how to hand express breast milk, if you prefer not to use a pump.   CARING FOR YOUR BREASTS WHILE YOU BREASTFEED  Nipples can become dry, cracked, and sore while breastfeeding. The following recommendations can help keep your breasts moisturized and healthy:  · Avoid using soap on your nipples.  · Wear a supportive bra. Although not required, special nursing bras and tank tops are designed to allow access to your breasts for breastfeeding without taking off your entire bra or top. Avoid wearing underwire style bras or extremely tight bras.  · Air dry your nipples for 3-4 minutes after each feeding.  · Use only cotton bra pads to absorb leaked breast milk. Leaking of breast milk between feedings is normal.  · Use lanolin on your nipples after breastfeeding. Lanolin  helps to maintain your skin's normal moisture barrier. If you use pure lanolin you do not need to wash it off before feeding your baby again. Pure lanolin is not toxic to your baby. You may also hand express a few drops of breast milk and gently massage that milk into your nipples and allow the milk to air dry.  In the first few weeks after giving birth, some women experience extremely full breasts (engorgement). Engorgement can make your breasts feel heavy, warm, and tender to the touch. Engorgement peaks within 3-5 days after you give birth. The following recommendations can help ease engorgement:  · Completely empty your breasts while breastfeeding or pumping. You may want to start by applying warm, moist heat (in the shower or with warm water-soaked hand towels) just before feeding or pumping. This increases circulation and helps the milk flow. If your baby does not completely empty your breasts while breastfeeding, pump any extra milk after he or she is finished.  · Wear a snug bra (nursing or regular) or tank top for 1-2 days to signal your body to slightly decrease milk production.  · Apply ice packs to your breasts, unless this is too uncomfortable for you.  · Make sure that your baby is latched on and positioned properly while breastfeeding.  If engorgement persists after 48 hours of following these recommendations, contact your health care provider or a lactation consultant.  OVERALL HEALTH CARE RECOMMENDATIONS WHILE BREASTFEEDING  · Eat healthy foods. Alternate between meals and snacks, eating 3 of each per day. Because what you eat affects your breast milk, some of the foods may make your baby more irritable than usual. Avoid eating these foods if you are sure that they are negatively affecting your baby.  · Drink milk, fruit juice, and water to satisfy your thirst (about 10 glasses a day).  · Rest often, relax, and continue to take your prenatal vitamins to prevent fatigue, stress, and  anemia.  · Continue breast self-awareness checks.  · Avoid chewing and smoking tobacco.  · Avoid alcohol and drug use.  Some medicines that may be harmful to your baby can pass through breast milk. It is important to ask your health care provider before taking any medicine, including all over-the-counter and prescription medicine as well as vitamin and herbal supplements.  It is possible to become pregnant while breastfeeding. If birth control is desired, ask your health care provider about options that will be safe for your baby.  SEEK MEDICAL CARE IF:   · You feel like you want to stop breastfeeding or have become frustrated with breastfeeding.  · You have painful breasts or nipples.  · Your nipples are cracked or bleeding.  · Your breasts are red, tender, or warm.  · You have a swollen area on either breast.  · You have a fever or chills.  · You have nausea or vomiting.  · You have drainage other than breast milk from your nipples.  · Your breasts do not become full before feedings by the 5th day after you give birth.  · You feel sad and depressed.  · Your baby is too sleepy to eat well.  · Your baby is having trouble sleeping.  · Your baby is wetting less than 3 diapers in a 24-hour period.  · Your baby has less than 3 stools in a 24-hour period.  · Your baby's skin or the white part of his or her eyes becomes yellow.  · Your baby is not gaining weight by 5 days of age.  SEEK IMMEDIATE MEDICAL CARE IF:   · Your baby is overly tired (lethargic) and does not want to wake up and feed.  · Your baby develops an unexplained fever.     This information is not intended to replace advice given to you by your health care provider. Make sure you discuss any questions you have with your health care provider.     Document Released: 10/14/2010 Document Revised: 01/08/2016 Document Reviewed: 06/12/2014  [employer] Interactive Patient Education ©2016 [employer]          Breastfeeding  Deciding to breastfeed is one of the best  choices you can make for you and your baby. A change in hormones during pregnancy causes your breast tissue to grow and increases the number and size of your milk ducts. These hormones also allow proteins, sugars, and fats from your blood supply to make breast milk in your milk-producing glands. Hormones prevent breast milk from being released before your baby is born as well as prompt milk flow after birth. Once breastfeeding has begun, thoughts of your baby, as well as his or her sucking or crying, can stimulate the release of milk from your milk-producing glands.   BENEFITS OF BREASTFEEDING  For Your Baby  · Your first milk (colostrum) helps your baby's digestive system function better.  · There are antibodies in your milk that help your baby fight off infections.  · Your baby has a lower incidence of asthma, allergies, and sudden infant death syndrome.  · The nutrients in breast milk are better for your baby than infant formulas and are designed uniquely for your baby's needs.  · Breast milk improves your baby's brain development.  · Your baby is less likely to develop other conditions, such as childhood obesity, asthma, or type 2 diabetes mellitus.  For You  · Breastfeeding helps to create a very special bond between you and your baby.  · Breastfeeding is convenient. Breast milk is always available at the correct temperature and costs nothing.  · Breastfeeding helps to burn calories and helps you lose the weight gained during pregnancy.  · Breastfeeding makes your uterus contract to its prepregnancy size faster and slows bleeding (lochia) after you give birth.    · Breastfeeding helps to lower your risk of developing type 2 diabetes mellitus, osteoporosis, and breast or ovarian cancer later in life.  SIGNS THAT YOUR BABY IS HUNGRY  Early Signs of Hunger  · Increased alertness or activity.  · Stretching.  · Movement of the head from side to side.  · Movement of the head and opening of the mouth when the corner  of the mouth or cheek is stroked (rooting).  · Increased sucking sounds, smacking lips, cooing, sighing, or squeaking.  · Hand-to-mouth movements.  · Increased sucking of fingers or hands.  Late Signs of Hunger  · Fussing.  · Intermittent crying.  Extreme Signs of Hunger  Signs of extreme hunger will require calming and consoling before your baby will be able to breastfeed successfully. Do not wait for the following signs of extreme hunger to occur before you initiate breastfeeding:  · Restlessness.  · A loud, strong cry.  · Screaming.  BREASTFEEDING BASICS  Breastfeeding Initiation  · Find a comfortable place to sit or lie down, with your neck and back well supported.  · Place a pillow or rolled up blanket under your baby to bring him or her to the level of your breast (if you are seated). Nursing pillows are specially designed to help support your arms and your baby while you breastfeed.  · Make sure that your baby's abdomen is facing your abdomen.  · Gently massage your breast. With your fingertips, massage from your chest wall toward your nipple in a circular motion. This encourages milk flow. You may need to continue this action during the feeding if your milk flows slowly.  · Support your breast with 4 fingers underneath and your thumb above your nipple. Make sure your fingers are well away from your nipple and your baby's mouth.  · Stroke your baby's lips gently with your finger or nipple.  · When your baby's mouth is open wide enough, quickly bring your baby to your breast, placing your entire nipple and as much of the colored area around your nipple (areola) as possible into your baby's mouth.    More areola should be visible above your baby's upper lip than below the lower lip.    Your baby's tongue should be between his or her lower gum and your breast.  · Ensure that your baby's mouth is correctly positioned around your nipple (latched). Your baby's lips should create a seal on your breast and be turned  out (everted).  · It is common for your baby to suck about 2-3 minutes in order to start the flow of breast milk.  Latching  Teaching your baby how to latch on to your breast properly is very important. An improper latch can cause nipple pain and decreased milk supply for you and poor weight gain in your baby. Also, if your baby is not latched onto your nipple properly, he or she may swallow some air during feeding. This can make your baby fussy. Burping your baby when you switch breasts during the feeding can help to get rid of the air. However, teaching your baby to latch on properly is still the best way to prevent fussiness from swallowing air while breastfeeding.  Signs that your baby has successfully latched on to your nipple:  · Silent tugging or silent sucking, without causing you pain.  · Swallowing heard between every 3-4 sucks.  · Muscle movement above and in front of his or her ears while sucking.  Signs that your baby has not successfully latched on to nipple:  · Sucking sounds or smacking sounds from your baby while breastfeeding.  · Nipple pain.  If you think your baby has not latched on correctly, slip your finger into the corner of your baby's mouth to break the suction and place it between your baby's gums. Attempt breastfeeding initiation again.  Signs of Successful Breastfeeding  Signs from your baby:  · A gradual decrease in the number of sucks or complete cessation of sucking.  · Falling asleep.  · Relaxation of his or her body.  · Retention of a small amount of milk in his or her mouth.  · Letting go of your breast by himself or herself.  Signs from you:  · Breasts that have increased in firmness, weight, and size 1-3 hours after feeding.  · Breasts that are softer immediately after breastfeeding.  · Increased milk volume, as well as a change in milk consistency and color by the fifth day of breastfeeding.  · Nipples that are not sore, cracked, or bleeding.  Signs That Your Baby is Getting  "Enough Milk  · Wetting at least 3 diapers in a 24-hour period. The urine should be clear and pale yellow by age 5 days.  · At least 3 stools in a 24-hour period by age 5 days. The stool should be soft and yellow.  · At least 3 stools in a 24-hour period by age 7 days. The stool should be seedy and yellow.  · No loss of weight greater than 10% of birth weight during the first 3 days of age.  · Average weight gain of 4-7 ounces (113-198 g) per week after age 4 days.  · Consistent daily weight gain by age 5 days, without weight loss after the age of 2 weeks.  After a feeding, your baby may spit up a small amount. This is common.  BREASTFEEDING FREQUENCY AND DURATION  Frequent feeding will help you make more milk and can prevent sore nipples and breast engorgement. Breastfeed when you feel the need to reduce the fullness of your breasts or when your baby shows signs of hunger. This is called \"breastfeeding on demand.\" Avoid introducing a pacifier to your baby while you are working to establish breastfeeding (the first 4-6 weeks after your baby is born). After this time you may choose to use a pacifier. Research has shown that pacifier use during the first year of a baby's life decreases the risk of sudden infant death syndrome (SIDS).  Allow your baby to feed on each breast as long as he or she wants. Breastfeed until your baby is finished feeding. When your baby unlatches or falls asleep while feeding from the first breast, offer the second breast. Because newborns are often sleepy in the first few weeks of life, you may need to awaken your baby to get him or her to feed.  Breastfeeding times will vary from baby to baby. However, the following rules can serve as a guide to help you ensure that your baby is properly fed:  · Newborns (babies 4 weeks of age or younger) may breastfeed every 1-3 hours.  · Newborns should not go longer than 3 hours during the day or 5 hours during the night without breastfeeding.  · You " should breastfeed your baby a minimum of 8 times in a 24-hour period until you begin to introduce solid foods to your baby at around 6 months of age.  BREAST MILK PUMPING  Pumping and storing breast milk allows you to ensure that your baby is exclusively fed your breast milk, even at times when you are unable to breastfeed. This is especially important if you are going back to work while you are still breastfeeding or when you are not able to be present during feedings. Your lactation consultant can give you guidelines on how long it is safe to store breast milk.  A breast pump is a machine that allows you to pump milk from your breast into a sterile bottle. The pumped breast milk can then be stored in a refrigerator or freezer. Some breast pumps are operated by hand, while others use electricity. Ask your lactation consultant which type will work best for you. Breast pumps can be purchased, but some hospitals and breastfeeding support groups lease breast pumps on a monthly basis. A lactation consultant can teach you how to hand express breast milk, if you prefer not to use a pump.  CARING FOR YOUR BREASTS WHILE YOU BREASTFEED  Nipples can become dry, cracked, and sore while breastfeeding. The following recommendations can help keep your breasts moisturized and healthy:  · Avoid using soap on your nipples.  · Wear a supportive bra. Although not required, special nursing bras and tank tops are designed to allow access to your breasts for breastfeeding without taking off your entire bra or top. Avoid wearing underwire-style bras or extremely tight bras.  · Air dry your nipples for 3-4 minutes after each feeding.  · Use only cotton bra pads to absorb leaked breast milk. Leaking of breast milk between feedings is normal.  · Use lanolin on your nipples after breastfeeding. Lanolin helps to maintain your skin's normal moisture barrier. If you use pure lanolin, you do not need to wash it off before feeding your baby again.  Pure lanolin is not toxic to your baby. You may also hand express a few drops of breast milk and gently massage that milk into your nipples and allow the milk to air dry.  In the first few weeks after giving birth, some women experience extremely full breasts (engorgement). Engorgement can make your breasts feel heavy, warm, and tender to the touch. Engorgement peaks within 3-5 days after you give birth. The following recommendations can help ease engorgement:  · Completely empty your breasts while breastfeeding or pumping. You may want to start by applying warm, moist heat (in the shower or with warm water-soaked hand towels) just before feeding or pumping. This increases circulation and helps the milk flow. If your baby does not completely empty your breasts while breastfeeding, pump any extra milk after he or she is finished.  · Wear a snug bra (nursing or regular) or tank top for 1-2 days to signal your body to slightly decrease milk production.  · Apply ice packs to your breasts, unless this is too uncomfortable for you.  · Make sure that your baby is latched on and positioned properly while breastfeeding.  If engorgement persists after 48 hours of following these recommendations, contact your health care provider or a lactation consultant.  OVERALL HEALTH CARE RECOMMENDATIONS WHILE BREASTFEEDING  · Eat healthy foods. Alternate between meals and snacks, eating 3 of each per day. Because what you eat affects your breast milk, some of the foods may make your baby more irritable than usual. Avoid eating these foods if you are sure that they are negatively affecting your baby.  · Drink milk, fruit juice, and water to satisfy your thirst (about 10 glasses a day).  · Rest often, relax, and continue to take your prenatal vitamins to prevent fatigue, stress, and anemia.  · Continue breast self-awareness checks.  · Avoid chewing and smoking tobacco. Chemicals from cigarettes that pass into breast milk and exposure to  secondhand smoke may harm your baby.  · Avoid alcohol and drug use, including marijuana.  Some medicines that may be harmful to your baby can pass through breast milk. It is important to ask your health care provider before taking any medicine, including all over-the-counter and prescription medicine as well as vitamin and herbal supplements.  It is possible to become pregnant while breastfeeding. If birth control is desired, ask your health care provider about options that will be safe for your baby.  SEEK MEDICAL CARE IF:  · You feel like you want to stop breastfeeding or have become frustrated with breastfeeding.  · You have painful breasts or nipples.  · Your nipples are cracked or bleeding.  · Your breasts are red, tender, or warm.  · You have a swollen area on either breast.  · You have a fever or chills.  · You have nausea or vomiting.  · You have drainage other than breast milk from your nipples.  · Your breasts do not become full before feedings by the fifth day after you give birth.  · You feel sad and depressed.  · Your baby is too sleepy to eat well.  · Your baby is having trouble sleeping.    · Your baby is wetting less than 3 diapers in a 24-hour period.  · Your baby has less than 3 stools in a 24-hour period.  · Your baby's skin or the white part of his or her eyes becomes yellow.    · Your baby is not gaining weight by 5 days of age.  SEEK IMMEDIATE MEDICAL CARE IF:  · Your baby is overly tired (lethargic) and does not want to wake up and feed.  · Your baby develops an unexplained fever.     This information is not intended to replace advice given to you by your health care provider. Make sure you discuss any questions you have with your health care provider.     Document Released: 12/18/2006 Document Revised: 09/07/2016 Document Reviewed: 06/11/2014  [employer] Interactive Patient Education ©2016 [employer]          Breast Engorgement  Breast engorgement is the overfilling of your breasts with  breast milk. In the first few weeks after giving birth, you may experience breast engorgement. Although it is normal for your breasts to feel heavy, full, and uncomfortable within 3-5 days of giving birth, breast engorgement can make your breasts throb and feel hard, tightly stretched, warm, and tender. Engorgement peaks about the fifth day after you give birth. Breast engorgement can be easily treated and does not require you to stop breastfeeding.   CAUSES OF BREAST ENGORGEMENT  Some women delay feedings because of sore or cracked nipples, which can lead to engorgement. Cracked and sore nipples often are caused by inadequate latching (when your baby's mouth attaches to your breast to breastfeed). If your baby is latched on properly, he or she should be able to breastfeed as long as needed, without causing any pain. If you do feel pain while breastfeeding, take your baby off your breast and try again. Get help from your health care provider or a lactation consultant if you continue to have pain.  Other causes of engorgement include:   · Improper position of your baby while breastfeeding.  · Allowing too much time to pass between feedings.  · Reduction in breastfeeding because you give your baby water, juice, formula, breast milk from a bottle, or a pacifier instead of breastfeeding.  · Changes in your baby's feeding patterns.  · Weak sucking from your baby, which causes less milk to be taken out of your breast during feedings.  · Fatigue, stress, anemia.  · Plugged milk ducts.  · A history of breast surgery.  SIGNS AND SYMPTOMS OF BREAST ENGORGEMENT  If your breasts become engorged, you may experience:   · Breast swelling, tenderness, warmth, redness, or throbbing.  · Breast hardness and stretching of the skin around your breast.  · Flattening, tightening, and hardening of your nipple.  · A low-grade fever, which can be confused with a breast infection.  RECOMMENDATIONS TO EASE BREAST ENGORGEMENT  Breast  "engorgement should improve in 24-48 hours after following these recommendations:  · Breastfeed when you feel the need to reduce the fullness of your breasts or when your baby shows signs of hunger. This is called \"breastfeeding on demand.\"  · Newborns (babies younger than 4 weeks) often breastfeed every 1-3 hours during the day. You may need to awaken your baby to feed if he or she is asleep at a feeding time.  · Do not allow your baby to sleep longer than 5 hours during the night without a feeding.  · Pump or hand-express breast milk before breastfeeding to soften your breast, areola, and nipple.    · Apply warm, moist heat (in the shower or with warm water-soaked hand towels) just before feeding or pumping, or massage your breast before or during breastfeeding. This increases circulation and helps your milk to flow.  · Completely empty your breasts when breastfeeding or pumping. Afterward, wear a snug bra (nursing or regular) or tank top for 1-2 days to signal your body to slightly decrease milk production. Only wear snug bras or tank tops to treat engorgement. Tight bras typically should be avoided by breastfeeding mothers. Once engorgement is relieved, return to wearing regular, loose-fitting clothes.  · Apply ice packs to your breasts to lessen the pain from engorgement and relieve swelling, unless the ice is uncomfortable for you.  · Do not delay feedings. Try to relax when it is time to feed your baby. This helps to trigger your \"let-down reflex,\" which releases milk from your breast.  · Ensure your baby is latched on to your breast and positioned properly while breastfeeding.    · Allow your baby to remain at your breast as long as he or she is latched on well and actively sucking. Your baby will let you know when he or she is done breastfeeding by pulling away from your breast or falling asleep.  · Avoid introducing bottles or pacifiers to your baby in the early weeks of breastfeeding. Wait to introduce " these things until after resolving any breastfeeding challenges.  · Try to pump your milk on the same schedule as when your baby would breastfeed if you are returning to work or away from home for an extended period.  · Drink plenty of fluids to avoid dehydration, which can eventually put you at greater risk of breast engorgement.    CALL YOUR HEALTH CARE PROVIDER OR LACTATION CONSULTANT IF:   · Engorgement lasts longer than 2 days, even after treatment.  · You have flu-like symptoms, such as a fever, chills, or body aches.  · Your breasts become increasingly red and painful.     This information is not intended to replace advice given to you by your health care provider. Make sure you discuss any questions you have with your health care provider.     Document Released: 04/14/2006 Document Revised: 01/08/2016 Document Reviewed: 06/12/2014  [employer] Interactive Patient Education ©2016 [employer]          Breastfeeding and Mastitis  Mastitis is inflammation of the breast tissue. It can occur in women who are breastfeeding. This can make breastfeeding painful. Mastitis will sometimes go away on its own. Your health care provider will help determine if treatment is needed.  CAUSES  Mastitis is often associated with a blocked milk (lactiferous) duct. This can happen when too much milk builds up in the breast. Causes of excess milk in the breast can include:  · Poor latch-on. If your baby is not latched onto the breast properly, she or he may not empty your breast completely while breastfeeding.  · Allowing too much time to pass between feedings.  · Wearing a bra or other clothing that is too tight. This puts extra pressure on the lactiferous ducts so milk does not flow through them as it should.  Mastitis can also be caused by a bacterial infection. Bacteria may enter the breast tissue through cuts or openings in the skin. In women who are breastfeeding, this may occur because of cracked or irritated skin. Cracks in  the skin are often caused when your baby does not latch on properly to the breast.  SIGNS AND SYMPTOMS  · Swelling, redness, tenderness, and pain in an area of the breast.  · Swelling of the glands under the arm on the same side.  · Fever may or may not accompany mastitis.  If an infection is allowed to progress, a collection of pus (abscess) may develop.  DIAGNOSIS   Your health care provider can usually diagnose mastitis based on your symptoms and a physical exam. Tests may be done to help confirm the diagnosis. These may include:  · Removal of pus from the breast by applying pressure to the area. This pus can be examined in the lab to determine which bacteria are present. If an abscess has developed, the fluid in the abscess can be removed with a needle. This can also be used to confirm the diagnosis and determine the bacteria present. In most cases, pus will not be present.  · Blood tests to determine if your body is fighting a bacterial infection.  · Mammogram or ultrasound tests to rule out other problems or diseases.  TREATMENT   Mastitis that occurs with breastfeeding will sometimes go away on its own. Your health care provider may choose to wait 24 hours after first seeing you to decide whether a prescription medicine is needed. If your symptoms are worse after 24 hours, your health care provider will likely prescribe an antibiotic medicine to treat the mastitis. He or she will determine which bacteria are most likely causing the infection and will then select an appropriate antibiotic medicine. This is sometimes changed based on the results of tests performed to identify the bacteria, or if there is no response to the antibiotic medicine selected. Antibiotic medicines are usually given by mouth. You may also be given medicine for pain.  HOME CARE INSTRUCTIONS  · Only take over-the-counter or prescription medicines for pain, fever, or discomfort as directed by your health care provider.  · If your health  care provider prescribed an antibiotic medicine, take the medicine as directed. Make sure you finish it even if you start to feel better.  · Do not wear a tight or underwire bra. Wear a soft, supportive bra.  · Increase your fluid intake, especially if you have a fever.  · Continue to empty the breast. Your health care provider can tell you whether this milk is safe for your infant or needs to be thrown out. You may be told to stop nursing until your health care provider thinks it is safe for your baby. Use a breast pump if you are advised to stop nursing.  · Keep your nipples clean and dry.  · Empty the first breast completely before going to the other breast. If your baby is not emptying your breasts completely for some reason, use a breast pump to empty your breasts.  · If you go back to work, pump your breasts while at work to stay in time with your nursing schedule.  · Avoid allowing your breasts to become overly filled with milk (engorged).  SEEK MEDICAL CARE IF:  · You have pus-like discharge from the breast.  · Your symptoms do not improve with the treatment prescribed by your health care provider within 2 days.  SEEK IMMEDIATE MEDICAL CARE IF:  · Your pain and swelling are getting worse.  · You have pain that is not controlled with medicine.  · You have a red line extending from the breast toward your armpit.  · You have a fever or persistent symptoms for more than 2-3 days.  · You have a fever and your symptoms suddenly get worse.  MAKE SURE YOU:   · Understand these instructions.  · Will watch your condition.  · Will get help right away if you are not doing well or get worse.     This information is not intended to replace advice given to you by your health care provider. Make sure you discuss any questions you have with your health care provider.     Document Released: 04/14/2006 Document Revised: 12/23/2014 Document Reviewed: 07/24/2014  [employer]vier Interactive Patient Education ©2016 [employer]          Acetaminophen; Oxycodone tablets  What is this medicine?  ACETAMINOPHEN; OXYCODONE (a set a BRIGID kristel fen; ox i KOE done) is a pain reliever. It is used to treat moderate to severe pain.  This medicine may be used for other purposes; ask your health care provider or pharmacist if you have questions.  What should I tell my health care provider before I take this medicine?  They need to know if you have any of these conditions:  -brain tumor  -Crohn's disease, inflammatory bowel disease, or ulcerative colitis  -drug abuse or addiction  -head injury  -heart or circulation problems  -if you often drink alcohol  -kidney disease or problems going to the bathroom  -liver disease  -lung disease, asthma, or breathing problems  -an unusual or allergic reaction to acetaminophen, oxycodone, other opioid analgesics, other medicines, foods, dyes, or preservatives  -pregnant or trying to get pregnant  -breast-feeding  How should I use this medicine?  Take this medicine by mouth with a full glass of water. Follow the directions on the prescription label. You can take it with or without food. If it upsets your stomach, take it with food. Take your medicine at regular intervals. Do not take it more often than directed.  Talk to your pediatrician regarding the use of this medicine in children. Special care may be needed.  Patients over 65 years old may have a stronger reaction and need a smaller dose.  Overdosage: If you think you have taken too much of this medicine contact a poison control center or emergency room at once.  NOTE: This medicine is only for you. Do not share this medicine with others.  What if I miss a dose?  If you miss a dose, take it as soon as you can. If it is almost time for your next dose, take only that dose. Do not take double or extra doses.  What may interact with this medicine?  -alcohol  -antihistamines  -barbiturates like amobarbital, butalbital, butabarbital, methohexital, pentobarbital,  phenobarbital, thiopental, and secobarbital  -benztropine  -drugs for bladder problems like solifenacin, trospium, oxybutynin, tolterodine, hyoscyamine, and methscopolamine  -drugs for breathing problems like ipratropium and tiotropium  -drugs for certain stomach or intestine problems like propantheline, homatropine methylbromide, glycopyrrolate, atropine, belladonna, and dicyclomine  -general anesthetics like etomidate, ketamine, nitrous oxide, propofol, desflurane, enflurane, halothane, isoflurane, and sevoflurane  -medicines for depression, anxiety, or psychotic disturbances  -medicines for sleep  -muscle relaxants  -naltrexone  -narcotic medicines (opiates) for pain  -phenothiazines like perphenazine, thioridazine, chlorpromazine, mesoridazine, fluphenazine, prochlorperazine, promazine, and trifluoperazine  -scopolamine  -tramadol  -trihexyphenidyl  This list may not describe all possible interactions. Give your health care provider a list of all the medicines, herbs, non-prescription drugs, or dietary supplements you use. Also tell them if you smoke, drink alcohol, or use illegal drugs. Some items may interact with your medicine.  What should I watch for while using this medicine?  Tell your doctor or health care professional if your pain does not go away, if it gets worse, or if you have new or a different type of pain. You may develop tolerance to the medicine. Tolerance means that you will need a higher dose of the medication for pain relief. Tolerance is normal and is expected if you take this medicine for a long time.  Do not suddenly stop taking your medicine because you may develop a severe reaction. Your body becomes used to the medicine. This does NOT mean you are addicted. Addiction is a behavior related to getting and using a drug for a non-medical reason. If you have pain, you have a medical reason to take pain medicine. Your doctor will tell you how much medicine to take. If your doctor wants you  to stop the medicine, the dose will be slowly lowered over time to avoid any side effects.  You may get drowsy or dizzy. Do not drive, use machinery, or do anything that needs mental alertness until you know how this medicine affects you. Do not stand or sit up quickly, especially if you are an older patient. This reduces the risk of dizzy or fainting spells. Alcohol may interfere with the effect of this medicine. Avoid alcoholic drinks.  There are different types of narcotic medicines (opiates) for pain. If you take more than one type at the same time, you may have more side effects. Give your health care provider a list of all medicines you use. Your doctor will tell you how much medicine to take. Do not take more medicine than directed. Call emergency for help if you have problems breathing.  The medicine will cause constipation. Try to have a bowel movement at least every 2 to 3 days. If you do not have a bowel movement for 3 days, call your doctor or health care professional.  Do not take Tylenol (acetaminophen) or medicines that have acetaminophen with this medicine. Too much acetaminophen can be very dangerous. Many nonprescription medicines contain acetaminophen. Always read the labels carefully to avoid taking more acetaminophen.  What side effects may I notice from receiving this medicine?  Side effects that you should report to your doctor or health care professional as soon as possible:  -allergic reactions like skin rash, itching or hives, swelling of the face, lips, or tongue  -breathing difficulties, wheezing  -confusion  -light headedness or fainting spells  -severe stomach pain  -unusually weak or tired  -yellowing of the skin or the whites of the eyes  Side effects that usually do not require medical attention (report to your doctor or health care professional if they continue or are bothersome):  -dizziness  -drowsiness  -nausea  -vomiting  This list may not describe all possible side effects.  Call your doctor for medical advice about side effects. You may report side effects to FDA at 5-205-FDA-2834.  Where should I keep my medicine?  Keep out of the reach of children. This medicine can be abused. Keep your medicine in a safe place to protect it from theft. Do not share this medicine with anyone. Selling or giving away this medicine is dangerous and against the law.  This medicine may cause accidental overdose and death if it taken by other adults, children, or pets. Mix any unused medicine with a substance like cat litter or coffee grounds. Then throw the medicine away in a sealed container like a sealed bag or a coffee can with a lid. Do not use the medicine after the expiration date.  Store at room temperature between 20 and 25 degrees C (68 and 77 degrees F).  NOTE: This sheet is a summary. It may not cover all possible information. If you have questions about this medicine, talk to your doctor, pharmacist, or health care provider.     © 2016, [employer]. (2015-11-18 15:18:46)          Ibuprofen tablets and capsules  What is this medicine?  IBUPROFEN (eye BYOO proe fen) is a non-steroidal anti-inflammatory drug (NSAID). It is used for dental pain, fever, headaches or migraines, osteoarthritis, rheumatoid arthritis, or painful monthly periods. It can also relieve minor aches and pains caused by a cold, flu, or sore throat.  This medicine may be used for other purposes; ask your health care provider or pharmacist if you have questions.  What should I tell my health care provider before I take this medicine?  They need to know if you have any of these conditions:  -asthma  -cigarette smoker  -drink more than 3 alcohol containing drinks a day  -heart disease or circulation problems such as heart failure or leg edema (fluid retention)  -high blood pressure  -kidney disease  -liver disease  -stomach bleeding or ulcers  -an unusual or allergic reaction to ibuprofen, aspirin, other NSAIDS, other  medicines, foods, dyes, or preservatives  -pregnant or trying to get pregnant  -breast-feeding  How should I use this medicine?  Take this medicine by mouth with a glass of water. Follow the directions on the prescription label. Take this medicine with food if your stomach gets upset. Try to not lie down for at least 10 minutes after you take the medicine. Take your medicine at regular intervals. Do not take your medicine more often than directed.  A special MedGuide will be given to you by the pharmacist with each prescription and refill. Be sure to read this information carefully each time.  Talk to your pediatrician regarding the use of this medicine in children. Special care may be needed.  Overdosage: If you think you have taken too much of this medicine contact a poison control center or emergency room at once.  NOTE: This medicine is only for you. Do not share this medicine with others.  What if I miss a dose?  If you miss a dose, take it as soon as you can. If it is almost time for your next dose, take only that dose. Do not take double or extra doses.  What may interact with this medicine?  Do not take this medicine with any of the following medications:  -cidofovir  -ketorolac  -methotrexate  -pemetrexed  This medicine may also interact with the following medications:  -alcohol  -aspirin  -diuretics  -lithium  -other drugs for inflammation like prednisone  -warfarin  This list may not describe all possible interactions. Give your health care provider a list of all the medicines, herbs, non-prescription drugs, or dietary supplements you use. Also tell them if you smoke, drink alcohol, or use illegal drugs. Some items may interact with your medicine.  What should I watch for while using this medicine?  Tell your doctor or healthcare professional if your symptoms do not start to get better or if they get worse.  This medicine does not prevent heart attack or stroke. In fact, this medicine may increase the  chance of a heart attack or stroke. The chance may increase with longer use of this medicine and in people who have heart disease. If you take aspirin to prevent heart attack or stroke, talk with your doctor or health care professional.  Do not take other medicines that contain aspirin, ibuprofen, or naproxen with this medicine. Side effects such as stomach upset, nausea, or ulcers may be more likely to occur. Many medicines available without a prescription should not be taken with this medicine.  This medicine can cause ulcers and bleeding in the stomach and intestines at any time during treatment. Ulcers and bleeding can happen without warning symptoms and can cause death. To reduce your risk, do not smoke cigarettes or drink alcohol while you are taking this medicine.  You may get drowsy or dizzy. Do not drive, use machinery, or do anything that needs mental alertness until you know how this medicine affects you. Do not stand or sit up quickly, especially if you are an older patient. This reduces the risk of dizzy or fainting spells.  This medicine can cause you to bleed more easily. Try to avoid damage to your teeth and gums when you brush or floss your teeth.  This medicine may be used to treat migraines. If you take migraine medicines for 10 or more days a month, your migraines may get worse. Keep a diary of headache days and medicine use. Contact your healthcare professional if your migraine attacks occur more frequently.  What side effects may I notice from receiving this medicine?  Side effects that you should report to your doctor or health care professional as soon as possible:  -allergic reactions like skin rash, itching or hives, swelling of the face, lips, or tongue  -severe stomach pain  -signs and symptoms of bleeding such as bloody or black, tarry stools; red or dark-brown urine; spitting up blood or brown material that looks like coffee grounds; red spots on the skin; unusual bruising or bleeding  from the eye, gums, or nose  -signs and symptoms of a blood clot such as changes in vision; chest pain; severe, sudden headache; trouble speaking; sudden numbness or weakness of the face, arm, or leg  -unexplained weight gain or swelling  -unusually weak or tired  -yellowing of eyes or skin  Side effects that usually do not require medical attention (report to your doctor or health care professional if they continue or are bothersome):  -bruising  -diarrhea  -dizziness, drowsiness  -headache  -nausea, vomiting  This list may not describe all possible side effects. Call your doctor for medical advice about side effects. You may report side effects to FDA at 0-185-FDA-2976.  Where should I keep my medicine?  Keep out of the reach of children.  Store at room temperature between 15 and 30 degrees C (59 and 86 degrees F). Keep container tightly closed. Throw away any unused medicine after the expiration date.  NOTE: This sheet is a summary. It may not cover all possible information. If you have questions about this medicine, talk to your doctor, pharmacist, or health care provider.     © 2016, [employer]. (2014-08-19 10:48:02)          Prenatal Vitamin and Mineral Combinations (oral solid dosage forms)  What is this medicine?  PRENATAL VITAMIN AND MINERAL combinations are used before, during, and after pregnancy to help provide provide good nutrition.  This medicine may be used for other purposes; ask your health care provider or pharmacist if you have questions.  What should I tell my health care provider before I take this medicine?  They need to know if you have any of these conditions:  -bleeding or clotting disorder  -history of anemia of any type  -other chronic health condition  -an unusual or allergic reaction to vitamins, minerals, other medicines, foods, dyes, or preservatives  How should I use this medicine?  Take this medicine by mouth with a glass of water. You can take it with or without food.  If it upsets your stomach, take it with food. Chewable prenatal vitamin tablets may be chewed completely before swallowing. Follow the directions on the prescription label. The usual dose is taken once a day. Do not take your medicine more often than directed.  Contact your pediatrician regarding the use of this medicine in children. Special care may be needed. This medicine is intended for females who are pregnant, breast-feeding, or may become pregnant.  Overdosage: If you think you have taken too much of this medicine contact a poison control center or emergency room at once.  NOTE: This medicine is only for you. Do not share this medicine with others.  What if I miss a dose?  If you miss a dose, take it as soon as you can. If it is almost time for your next dose, take only that dose. Do not take double or extra doses.  What may interact with this medicine?  -alendronate  -antacids  -cefdinir  -cefditoren  -etidronate  -fluoroquinolone antibiotics (examples: ciprofloxacin, gatifloxacin, levofloxacin)  -ibandronate  -levodopa  -risedronate  -tetracycline antibiotics (examples: doxycycline, minocycline, tetracycline)  -thyroid hormones  -warfarin  This list may not describe all possible interactions. Give your health care provider a list of all the medicines, herbs, non-prescription drugs, or dietary supplements you use. Also tell them if you smoke, drink alcohol, or use illegal drugs. Some items may interact with your medicine.  What should I watch for while using this medicine?  See your health care professional for regular checks on your progress. Remember that vitamin and mineral supplements do not replace the need for good nutrition from a balanced diet.  Stools commonly change color when vitamins and minerals are taken. Notify your health care professional if this change is alarming or accompanied by other symptoms, like abdominal pain.  What side effects may I notice from receiving this medicine?  Side  effects that you should report to your doctor or health care professional as soon as possible:  -allergic reaction such as skin rash or difficulty breathing  -vomiting  Side effects that usually do not require medical attention (report to your doctor or health care professional if they continue or are bothersome):  -nausea  -stomach upset  This list may not describe all possible side effects. Call your doctor for medical advice about side effects. You may report side effects to FDA at 2-443-TBH-0323.  Where should I keep my medicine?  Keep out of the reach of children.  Most vitamins and minerals should be stored at controlled room temperature. Check your specific product directions. Protect from heat and moisture. Throw away any unused medicine after the expiration date.  NOTE: This sheet is a summary. It may not cover all possible information. If you have questions about this medicine, talk to your doctor, pharmacist, or health care provider.     © 2016, [employer]. (2016-07-14 09:02:38)          Mirtazapine tablets  What is this medicine?  MIRTAZAPINE (ysabel CHRISTOPHE a peen) is used to treat depression.  This medicine may be used for other purposes; ask your health care provider or pharmacist if you have questions.  What should I tell my health care provider before I take this medicine?  They need to know if you have any of these conditions:  -bipolar disorder  -glaucoma  -kidney disease  -liver disease  -suicidal thoughts  -an unusual or allergic reaction to mirtazapine, other medicines, foods, dyes, or preservatives  -pregnant or trying to get pregnant  -breast-feeding  How should I use this medicine?  Take this medicine by mouth with a glass of water. Follow the directions on the prescription label. Take your medicine at regular intervals. Do not take your medicine more often than directed. Do not stop taking this medicine suddenly except upon the advice of your doctor. Stopping this medicine too quickly  may cause serious side effects or your condition may worsen.  A special MedGuide will be given to you by the pharmacist with each prescription and refill. Be sure to read this information carefully each time.  Talk to your pediatrician regarding the use of this medicine in children. Special care may be needed.  Overdosage: If you think you have taken too much of this medicine contact a poison control center or emergency room at once.  NOTE: This medicine is only for you. Do not share this medicine with others.  What if I miss a dose?  If you miss a dose, take it as soon as you can. If it is almost time for your next dose, take only that dose. Do not take double or extra doses.  What may interact with this medicine?  Do not take this medicine with any of the following medications:  -linezolid  -MAOIs like Carbex, Eldepryl, Marplan, Nardil, and Parnate  -methylene blue (injected into a vein)  This medicine may also interact with the following medications:  -alcohol  -antiviral medicines for HIV or AIDS  -certain medicines that treat or prevent blood clots like warfarin  -certain medicines for depression, anxiety, or psychotic disturbances  -certain medicines for fungal infections like ketoconazole and itraconazole  -certain medicines for migraine headache like almotriptan, eletriptan, frovatriptan, naratriptan, rizatriptan, sumatriptan, zolmitriptan  -certain medicines for seizures like carbamazepine or phenytoin  -certain medicines for sleep  -cimetidine  -erythromycin  -fentanyl  -lithium  -medicines for blood pressure  -nefazodone  -rasagiline  -rifampin  -supplements like [place] wort, kava kava, valerian  -tramadol  -tryptophan  This list may not describe all possible interactions. Give your health care provider a list of all the medicines, herbs, non-prescription drugs, or dietary supplements you use. Also tell them if you smoke, drink alcohol, or use illegal drugs. Some items may interact with your  medicine.  What should I watch for while using this medicine?  Tell your doctor if your symptoms do not get better or if they get worse. Visit your doctor or health care professional for regular checks on your progress. Because it may take several weeks to see the full effects of this medicine, it is important to continue your treatment as prescribed by your doctor.  Patients and their families should watch out for new or worsening thoughts of suicide or depression. Also watch out for sudden changes in feelings such as feeling anxious, agitated, panicky, irritable, hostile, aggressive, impulsive, severely restless, overly excited and hyperactive, or not being able to sleep. If this happens, especially at the beginning of treatment or after a change in dose, call your health care professional.  You may get drowsy or dizzy. Do not drive, use machinery, or do anything that needs mental alertness until you know how this medicine affects you. Do not stand or sit up quickly, especially if you are an older patient. This reduces the risk of dizzy or fainting spells. Alcohol may interfere with the effect of this medicine. Avoid alcoholic drinks.  This medicine may cause dry eyes and blurred vision. If you wear contact lenses you may feel some discomfort. Lubricating drops may help. See your eye doctor if the problem does not go away or is severe.  Your mouth may get dry. Chewing sugarless gum or sucking hard candy, and drinking plenty of water may help. Contact your doctor if the problem does not go away or is severe.  What side effects may I notice from receiving this medicine?  Side effects that you should report to your doctor or health care professional as soon as possible:  -allergic reactions like skin rash, itching or hives, swelling of the face, lips, or tongue  -breathing problems  -confusion  -fever, sore throat, or mouth ulcers or blisters  -flu like symptoms including fever, chills, cough, muscle or joint aches  and pains  -stomach pain with nausea and/or vomiting  -suicidal thoughts or other mood changes  -swelling of the hands or feet  -unusual bleeding or bruising  -unusually weak or tired  -vomiting  Side effects that usually do not require medical attention (report to your doctor or health care professional if they continue or are bothersome):  -constipation  -increased appetite  -weight gain  This list may not describe all possible side effects. Call your doctor for medical advice about side effects. You may report side effects to FDA at 7-486-OHO-1949.  Where should I keep my medicine?  Keep out of the reach of children.  Store at room temperature between 15 and 30 degrees C (59 and 86 degrees F) Protect from light and moisture. Throw away any unused medicine after the expiration date.  NOTE: This sheet is a summary. It may not cover all possible information. If you have questions about this medicine, talk to your doctor, pharmacist, or health care provider.     © 2016, [employer]. (2014-07-11 13:11:19)            SYMPTOMS OF A STROKE    Call 911 or have someone take you to the Emergency Department if you have any of the following:    · Sudden numbness or weakness of your face, arm or leg especially on one side of the body  · Sudden confusion, diffiiculty speaking or trouble understanding   · Changes in your vision or loss of sight in one eye  · Sudden severe headache with no known cause  · sudden dizziness, trouble walking, loss of balance or coordination    It is important to seek emergency care right away if you have further stroke symptoms. If you get emergency help quickly, the powerful clot-dissolving medicines can reduce the disabilities caused by a stroke.     For more information:    American Stroke Association  4-029-0-STROKE  www.strokeassociation.org           IF YOU SMOKE OR USE TOBACCO PLEASE READ THE FOLLOWING:    Why is smoking bad for me?  Smoking increases the risk of heart disease, lung  disease, vascular disease, stroke, and cancer.     If you smoke, STOP!    If you would like more information on quitting smoking, please visit the [employer] website: [email or web address]   This link will provide additional resources including the QUIT line and the Beat the Pack support groups.     For more information:    American Cancer Society  (496) 858-9486    American Heart Association  1-899.168.2482               YOU ARE THE MOST IMPORTANT FACTOR IN YOUR RECOVERY.     Follow all instructions carefully.     I have reviewed my discharge instructions with my nurse, including the following information, if applicable:     Information about my illness and diagnosis   Follow up appointments (including lab draws)   Wound Care   Equipment Needs   Medications (new and continuing) along with side effects   Preventative information such as vaccines and smoking cessations   Diet   Pain   I know when to contact my Doctor's office or seek emergency care      I want my nurse to describe the side effects of my medications: YES NO   If the answer is no, I understand the side effects of my medications: YES NO   My nurse described the side effects of my medications in a way that I could understand: YES NO   I have taken my personal belongings and my own medications with me at discharge: YES NO            I have received this information and my questions have been answered. I have discussed any concerns I see with this plan with the nurse or physician. I understand these instructions.    Signature of Patient or Responsible Person: _____________________________________    Date: _________________  Time: __________________    Signature of Healthcare Provider: _______________________________________  Date: _________________  Time: __________________

## 2017-01-07 LAB
BASOPHILS # BLD AUTO: 0.02 10*3/MM3 (ref 0–0.2)
BASOPHILS NFR BLD AUTO: 0.3 % (ref 0–1.5)
DEPRECATED RDW RBC AUTO: 50.5 FL (ref 37–54)
EOSINOPHIL # BLD AUTO: 0.08 10*3/MM3 (ref 0–0.7)
EOSINOPHIL NFR BLD AUTO: 1 % (ref 0.3–6.2)
ERYTHROCYTE [DISTWIDTH] IN BLOOD BY AUTOMATED COUNT: 16.1 % (ref 11.7–13)
HCT VFR BLD AUTO: 26.4 % (ref 35.6–45.5)
HGB BLD-MCNC: 8.5 G/DL (ref 11.9–15.5)
IMM GRANULOCYTES # BLD: 0.03 10*3/MM3 (ref 0–0.03)
IMM GRANULOCYTES NFR BLD: 0.4 % (ref 0–0.5)
LYMPHOCYTES # BLD AUTO: 1.75 10*3/MM3 (ref 0.9–4.8)
LYMPHOCYTES NFR BLD AUTO: 22.7 % (ref 19.6–45.3)
MCH RBC QN AUTO: 28.2 PG (ref 26.9–32)
MCHC RBC AUTO-ENTMCNC: 32.2 G/DL (ref 32.4–36.3)
MCV RBC AUTO: 87.7 FL (ref 80.5–98.2)
MONOCYTES # BLD AUTO: 0.67 10*3/MM3 (ref 0.2–1.2)
MONOCYTES NFR BLD AUTO: 8.7 % (ref 5–12)
NEUTROPHILS # BLD AUTO: 5.16 10*3/MM3 (ref 1.9–8.1)
NEUTROPHILS NFR BLD AUTO: 66.9 % (ref 42.7–76)
PLATELET # BLD AUTO: 188 10*3/MM3 (ref 140–500)
PMV BLD AUTO: 9.4 FL (ref 6–12)
RBC # BLD AUTO: 3.01 10*6/MM3 (ref 3.9–5.2)
WBC NRBC COR # BLD: 7.71 10*3/MM3 (ref 4.5–10.7)

## 2017-01-07 PROCEDURE — 85025 COMPLETE CBC W/AUTO DIFF WBC: CPT | Performed by: OBSTETRICS & GYNECOLOGY

## 2017-01-07 RX ORDER — OXYCODONE HYDROCHLORIDE AND ACETAMINOPHEN 5; 325 MG/1; MG/1
2 TABLET ORAL EVERY 4 HOURS PRN
Status: DISCONTINUED | OUTPATIENT
Start: 2017-01-07 | End: 2017-01-08 | Stop reason: HOSPADM

## 2017-01-07 RX ADMIN — OXYCODONE HYDROCHLORIDE AND ACETAMINOPHEN 1 TABLET: 7.5; 325 TABLET ORAL at 17:02

## 2017-01-07 RX ADMIN — OXYCODONE HYDROCHLORIDE AND ACETAMINOPHEN 1 TABLET: 7.5; 325 TABLET ORAL at 06:26

## 2017-01-07 RX ADMIN — OXYCODONE HYDROCHLORIDE AND ACETAMINOPHEN 1 TABLET: 7.5; 325 TABLET ORAL at 02:35

## 2017-01-07 RX ADMIN — IBUPROFEN 800 MG: 800 TABLET ORAL at 02:35

## 2017-01-07 RX ADMIN — ALUMINUM HYDROXIDE, MAGNESIUM HYDROXIDE, AND SIMETHICONE 30 ML: 200; 200; 20 SUSPENSION ORAL at 05:39

## 2017-01-07 RX ADMIN — IBUPROFEN 800 MG: 800 TABLET ORAL at 10:54

## 2017-01-07 RX ADMIN — SODIUM CHLORIDE, POTASSIUM CHLORIDE, SODIUM LACTATE AND CALCIUM CHLORIDE 125 ML/HR: 600; 310; 30; 20 INJECTION, SOLUTION INTRAVENOUS at 00:28

## 2017-01-07 RX ADMIN — OXYCODONE HYDROCHLORIDE AND ACETAMINOPHEN 1 TABLET: 7.5; 325 TABLET ORAL at 10:54

## 2017-01-07 RX ADMIN — IBUPROFEN 800 MG: 800 TABLET ORAL at 18:00

## 2017-01-07 RX ADMIN — OXYCODONE HYDROCHLORIDE AND ACETAMINOPHEN 2 TABLET: 5; 325 TABLET ORAL at 20:21

## 2017-01-07 RX ADMIN — SIMETHICONE CHEW TAB 80 MG 80 MG: 80 TABLET ORAL at 10:54

## 2017-01-07 RX ADMIN — DOCUSATE SODIUM 100 MG: 100 CAPSULE, LIQUID FILLED ORAL at 17:23

## 2017-01-07 RX ADMIN — MIRTAZAPINE 30 MG: 30 TABLET, FILM COATED ORAL at 20:21

## 2017-01-07 RX ADMIN — SIMETHICONE CHEW TAB 80 MG 80 MG: 80 TABLET ORAL at 18:50

## 2017-01-07 NOTE — PROGRESS NOTES
[facility]   PROGRESS NOTE    Post-Op Day 1 S/P    Delivered a female infant.  Subjective     Patient reports:      Pain is well controlled. Voiding and ambulating without difficulty.   Tolerating po. Lochia normal.  No problems           Objective       Vitals: Vital Signs Range for the last 24 hours  Temperature: Temp:  [95.7 °F (35.4 °C)-98.2 °F (36.8 °C)] 97.5 °F (36.4 °C)   Temp Source: Temp src: Oral   BP: BP: ()/(48-76) 111/71   Pulse: Heart Rate:  [] 93   Respirations: Resp:  [16-18] 16         Intake/Output Summary (Last 24 hours) at 17 1201  Last data filed at 17 0500   Gross per 24 hour   Intake   1896 ml   Output    850 ml   Net   1046 ml                                              Physical Exam     General Alert and awake, in NAD      CV Regular rate and rhythm      Lungs clear to auscultation bilaterally        Abdomen Soft,  appropriately tender, non-distended, no rebound, no involuntary guarding..... Fundus-- firm, nontender, below umbilicus                Incision  Dressing clean, dry and intact.      Extremities  nl pp edema, calves NT               LABS:   Rh Status:    RH TYPE   Date Value Ref Range Status   2017 Positive  Final                                Results from last 7 days  Lab Units 17  0547   WBC 10*3/mm3 7.71   HEMOGLOBIN g/dL 8.5*   HEMATOCRIT % 26.4*   PLATELETS 10*3/mm3 188            Assessment/Plan      POD 1 S/P C/S:  Hemodynamically stable.  Doing well.     Anemia- expected as had been getting iv iron     Plan:  Continue routine care.         Laura Perdue MD  2017  12:01 PM

## 2017-01-08 VITALS
DIASTOLIC BLOOD PRESSURE: 69 MMHG | SYSTOLIC BLOOD PRESSURE: 109 MMHG | RESPIRATION RATE: 16 BRPM | TEMPERATURE: 98.7 F | HEIGHT: 64 IN | BODY MASS INDEX: 29.19 KG/M2 | WEIGHT: 171 LBS | HEART RATE: 90 BPM | OXYGEN SATURATION: 93 %

## 2017-01-08 RX ORDER — IBUPROFEN 800 MG/1
800 TABLET ORAL EVERY 8 HOURS PRN
Qty: 50 TABLET | Refills: 3 | Status: SHIPPED | OUTPATIENT
Start: 2017-01-08 | End: 2017-08-17

## 2017-01-08 RX ORDER — OXYCODONE HYDROCHLORIDE AND ACETAMINOPHEN 5; 325 MG/1; MG/1
1 TABLET ORAL EVERY 4 HOURS PRN
Qty: 30 TABLET | Refills: 0 | Status: SHIPPED | OUTPATIENT
Start: 2017-01-08 | End: 2017-01-16

## 2017-01-08 RX ADMIN — OXYCODONE HYDROCHLORIDE AND ACETAMINOPHEN 2 TABLET: 5; 325 TABLET ORAL at 12:44

## 2017-01-08 RX ADMIN — SIMETHICONE CHEW TAB 80 MG 80 MG: 80 TABLET ORAL at 08:45

## 2017-01-08 RX ADMIN — DOCUSATE SODIUM 100 MG: 100 CAPSULE, LIQUID FILLED ORAL at 08:44

## 2017-01-08 RX ADMIN — OXYCODONE HYDROCHLORIDE AND ACETAMINOPHEN 2 TABLET: 5; 325 TABLET ORAL at 00:37

## 2017-01-08 RX ADMIN — IBUPROFEN 800 MG: 800 TABLET ORAL at 11:08

## 2017-01-08 RX ADMIN — IBUPROFEN 800 MG: 800 TABLET ORAL at 01:56

## 2017-01-08 RX ADMIN — OXYCODONE HYDROCHLORIDE AND ACETAMINOPHEN 2 TABLET: 5; 325 TABLET ORAL at 08:45

## 2017-01-08 RX ADMIN — Medication: at 08:45

## 2017-01-08 RX ADMIN — SIMETHICONE CHEW TAB 80 MG 80 MG: 80 TABLET ORAL at 08:47

## 2017-01-08 RX ADMIN — MEASLES, MUMPS, AND RUBELLA VIRUS VACCINE LIVE 0.5 ML: 1000; 12500; 1000 INJECTION, POWDER, LYOPHILIZED, FOR SUSPENSION SUBCUTANEOUS at 14:31

## 2017-01-08 RX ADMIN — OXYCODONE HYDROCHLORIDE AND ACETAMINOPHEN 2 TABLET: 5; 325 TABLET ORAL at 04:36

## 2017-01-08 NOTE — PLAN OF CARE
Problem: Patient Care Overview (Adult)  Goal: Plan of Care Review  Outcome: Ongoing (interventions implemented as appropriate)    17   Coping/Psychosocial Response Interventions   Plan Of Care Reviewed With patient   Patient Care Overview   Progress improving   Outcome Evaluation   Outcome Summary/Follow up Plan paint experiencing increased pain, oob ad gwen, encouraged staying on top of pain medication and set a plan in palce, ice packs given for incision        Goal: Adult Individualization and Mutuality  Outcome: Ongoing (interventions implemented as appropriate)  Goal: Discharge Needs Assessment  Outcome: Ongoing (interventions implemented as appropriate)    17   Discharge Needs Assessment   Concerns To Be Addressed no discharge needs identified   Readmission Within The Last 30 Days no previous admission in last 30 days   Equipment Needed After Discharge none   Discharge Disposition home or self-care   Current Health   Anticipated Changes Related to Illness none   Self-Care   Equipment Currently Used at Home none   Living Environment   Transportation Available none         17   Discharge Needs Assessment   Concerns To Be Addressed no discharge needs identified   Readmission Within The Last 30 Days no previous admission in last 30 days   Equipment Needed After Discharge none   Discharge Disposition home or self-care   Current Health   Anticipated Changes Related to Illness none   Self-Care   Equipment Currently Used at Home none   Living Environment   Transportation Available none         Problem: Postpartum ( Delivery) (Adult)  Goal: Signs and Symptoms of Listed Potential Problems Will be Absent or Manageable (Postpartum)  Outcome: Ongoing (interventions implemented as appropriate)    17   Postpartum ( Delivery)   Problems Assessed (Postpartum ) all   Problems Present (Postpartum ) none

## 2017-01-08 NOTE — DISCHARGE SUMMARY
[facility]   PROGRESS NOTE    Patient Name: Rema Tee  :  1990  MRN:  [id number]      Post-Op Day 2 S/P   Subjective     Patient reports:    Doing well - ready for discharge. Pain well controlled. Tolerating po and having flatus. Voiding and ambulating without difficulty. Lochia normal.     Objective       Vitals: Vital Signs Range for the last 24 hours  Temperature: Temp:  [97.8 °F (36.6 °C)-98.7 °F (37.1 °C)] 98.7 °F (37.1 °C)       BP: BP: (100-115)/(65-71) 109/69   Pulse: Heart Rate:  [80-94] 90                                                         Physical  Exam     Gen: Alert and awake    Abdomen: Soft, ND,  Fundus firm with minimal tenderness    Incision : Intact, without erythema or exudate    Extremities: Calves NT bilaterally               Assessment/Plan ]   Assessment:  POD **2*  -  Doing well. Stable for discharge.     Plan:    Instructions reviewed - no driving for 2 weeks and no heavy lifting for 6 weeks, pelvic rest for 6 weeks.   Follow up in 6 weeks.   Rx on chart.                 Jaylin Laureano MD  2017  10:09 AM

## 2017-01-09 LAB
CYTO UR: NORMAL
LAB AP CASE REPORT: NORMAL
Lab: NORMAL
PATH REPORT.FINAL DX SPEC: NORMAL
PATH REPORT.GROSS SPEC: NORMAL

## 2017-02-28 NOTE — OP NOTE
DATE OF PROCEDURE: 2017     PREOPERATIVE DIAGNOSIS: Undesired fertility.     POSTOPERATIVE DIAGNOSIS: Undesired fertility.     SURGEON: Jaylin Laureano MD     ASSISTANT: Christoph Hanna MD     PROCEDURE PERFORMED: This is a postpartum sterilization that was done in the midst of this  section that has been otherwise dictated.     INDICATIONS: The patient has no desire for further childbearing and is having a primary  section. She desires tubal ligation and this was not indicated at her original operative note. In the midst of this , once the uterine incision had been closed, the abdominal cavity was irrigated and tubes and ovaries appeared normal. The isthmic portion of each tube was doubly ligated with plain gut and excised for pathologic evaluation. The tubal stumps were dry.     This is the end of the postpartum sterilization. I will resume the  section as dictated.     SPECIMEN: Pathology specimen and our tubal specimens.     ESTIMATED BLOOD LOSS: Minimal for the postpartum sterilization.         Jaylin Laureano M.D.  LTP:aw  D:   2017 20:43:26  T:   2017 21:02:24  Job ID:   [id number]  Document ID:   [id number]  cc:

## 2017-03-02 ENCOUNTER — HOSPITAL ENCOUNTER (EMERGENCY)
Dept: HOSPITAL 23 - CED | Age: 27
Discharge: HOME | End: 2017-03-02
Payer: COMMERCIAL

## 2017-03-02 DIAGNOSIS — Z88.8: ICD-10-CM

## 2017-03-02 DIAGNOSIS — J02.0: Primary | ICD-10-CM

## 2017-03-02 LAB
INFLUENZA A: (no result)
INFLUENZA B: (no result)

## 2017-08-17 ENCOUNTER — LAB (OUTPATIENT)
Dept: LAB | Facility: HOSPITAL | Age: 27
End: 2017-08-17

## 2017-08-17 ENCOUNTER — CONSULT (OUTPATIENT)
Dept: ONCOLOGY | Facility: CLINIC | Age: 27
End: 2017-08-17

## 2017-08-17 VITALS
WEIGHT: 127 LBS | HEIGHT: 65 IN | RESPIRATION RATE: 18 BRPM | SYSTOLIC BLOOD PRESSURE: 112 MMHG | OXYGEN SATURATION: 100 % | HEART RATE: 73 BPM | BODY MASS INDEX: 21.16 KG/M2 | DIASTOLIC BLOOD PRESSURE: 78 MMHG | TEMPERATURE: 98.7 F

## 2017-08-17 DIAGNOSIS — R23.3 EASY BRUISING: ICD-10-CM

## 2017-08-17 DIAGNOSIS — D64.9 ANEMIA, UNSPECIFIED TYPE: Primary | ICD-10-CM

## 2017-08-17 DIAGNOSIS — A74.9 CHLAMYDIA: Primary | ICD-10-CM

## 2017-08-17 LAB
BASOPHILS # BLD AUTO: 0.03 10*3/MM3 (ref 0–0.1)
BASOPHILS NFR BLD AUTO: 0.4 % (ref 0–1.1)
DEPRECATED RDW RBC AUTO: 47.8 FL (ref 37–49)
EOSINOPHIL # BLD AUTO: 0.14 10*3/MM3 (ref 0–0.36)
EOSINOPHIL NFR BLD AUTO: 1.9 % (ref 1–5)
ERYTHROCYTE [DISTWIDTH] IN BLOOD BY AUTOMATED COUNT: 15.1 % (ref 11.7–14.5)
HCT VFR BLD AUTO: 35.3 % (ref 34–45)
HGB BLD-MCNC: 12 G/DL (ref 11.5–14.9)
IMM GRANULOCYTES # BLD: 0.05 10*3/MM3 (ref 0–0.03)
IMM GRANULOCYTES NFR BLD: 0.7 % (ref 0–0.5)
LYMPHOCYTES # BLD AUTO: 1.7 10*3/MM3 (ref 1–3.5)
LYMPHOCYTES NFR BLD AUTO: 22.9 % (ref 20–49)
MCH RBC QN AUTO: 29.2 PG (ref 27–33)
MCHC RBC AUTO-ENTMCNC: 34 G/DL (ref 32–35)
MCV RBC AUTO: 85.9 FL (ref 83–97)
MONOCYTES # BLD AUTO: 0.61 10*3/MM3 (ref 0.25–0.8)
MONOCYTES NFR BLD AUTO: 8.2 % (ref 4–12)
NEUTROPHILS # BLD AUTO: 4.88 10*3/MM3 (ref 1.5–7)
NEUTROPHILS NFR BLD AUTO: 65.9 % (ref 39–75)
NRBC BLD MANUAL-RTO: 0 /100 WBC (ref 0–0)
PLATELET # BLD AUTO: 246 10*3/MM3 (ref 150–375)
PMV BLD AUTO: 9.9 FL (ref 8.9–12.1)
RBC # BLD AUTO: 4.11 10*6/MM3 (ref 3.9–5)
WBC NRBC COR # BLD: 7.41 10*3/MM3 (ref 4–10)

## 2017-08-17 PROCEDURE — 85025 COMPLETE CBC W/AUTO DIFF WBC: CPT | Performed by: INTERNAL MEDICINE

## 2017-08-17 PROCEDURE — 36416 COLLJ CAPILLARY BLOOD SPEC: CPT | Performed by: INTERNAL MEDICINE

## 2017-08-17 PROCEDURE — 99243 OFF/OP CNSLTJ NEW/EST LOW 30: CPT | Performed by: INTERNAL MEDICINE

## 2017-08-17 RX ORDER — MIRTAZAPINE 15 MG/1
TABLET, FILM COATED ORAL
Refills: 2 | COMMUNITY
Start: 2017-08-06 | End: 2019-05-24 | Stop reason: ALTCHOICE

## 2017-08-17 RX ORDER — LISDEXAMFETAMINE DIMESYLATE 60 MG/1
CAPSULE ORAL
Refills: 0 | COMMUNITY
Start: 2017-06-16 | End: 2019-05-24 | Stop reason: ALTCHOICE
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
## 2019-05-24 ENCOUNTER — OFFICE VISIT (OUTPATIENT)
Dept: OBSTETRICS AND GYNECOLOGY | Facility: CLINIC | Age: 29
End: 2019-05-24

## 2019-05-24 VITALS
BODY MASS INDEX: 23.21 KG/M2 | SYSTOLIC BLOOD PRESSURE: 99 MMHG | HEART RATE: 81 BPM | WEIGHT: 131 LBS | HEIGHT: 63 IN | DIASTOLIC BLOOD PRESSURE: 62 MMHG

## 2019-05-24 DIAGNOSIS — Z11.3 SCREEN FOR STD (SEXUALLY TRANSMITTED DISEASE): ICD-10-CM

## 2019-05-24 DIAGNOSIS — Z11.4 SCREENING FOR HIV (HUMAN IMMUNODEFICIENCY VIRUS): ICD-10-CM

## 2019-05-24 DIAGNOSIS — N64.4 BILATERAL MASTODYNIA: ICD-10-CM

## 2019-05-24 DIAGNOSIS — Z12.4 SCREENING FOR CERVICAL CANCER: ICD-10-CM

## 2019-05-24 DIAGNOSIS — Z01.419 ENCOUNTER FOR GYNECOLOGICAL EXAMINATION: Primary | ICD-10-CM

## 2019-05-24 PROCEDURE — 99395 PREV VISIT EST AGE 18-39: CPT | Performed by: OBSTETRICS & GYNECOLOGY

## 2019-05-24 RX ORDER — PAROXETINE HYDROCHLORIDE 20 MG/1
TABLET, FILM COATED ORAL
COMMUNITY
Start: 2019-04-29

## 2019-05-24 RX ORDER — DEXTROAMPHETAMINE SACCHARATE, AMPHETAMINE ASPARTATE, DEXTROAMPHETAMINE SULFATE AND AMPHETAMINE SULFATE 7.5; 7.5; 7.5; 7.5 MG/1; MG/1; MG/1; MG/1
TABLET ORAL
COMMUNITY
Start: 2019-04-29
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
## 2019-05-25 LAB
HBV SURFACE AG SERPL QL IA: NEGATIVE
HCV AB S/CO SERPL IA: <0.1 S/CO RATIO (ref 0–0.9)
HCV AB SERPL QL IA: NORMAL
HIV 1+2 AB+HIV1 P24 AG SERPL QL IA: NON REACTIVE

## 2019-05-26 LAB — RPR SER QL: NON REACTIVE

## 2019-05-28 ENCOUNTER — TELEPHONE (OUTPATIENT)
Dept: OBSTETRICS AND GYNECOLOGY | Facility: CLINIC | Age: 29
End: 2019-05-28

## 2019-05-28 NOTE — TELEPHONE ENCOUNTER
Left message to call office. IGNACIO      ----- Message from Ty Peralta MD sent at 5/27/2019 12:31 PM EDT -----  Notify the patient that her STD blood work was normal.  I am still waiting for the Pap and culture results

## 2019-05-29 LAB
C TRACH RRNA SPEC QL NAA+PROBE: NEGATIVE
N GONORRHOEA RRNA SPEC QL NAA+PROBE: NEGATIVE
T VAGINALIS RRNA VAG QL NAA+PROBE: NEGATIVE

## 2019-05-30 ENCOUNTER — TELEPHONE (OUTPATIENT)
Dept: SURGERY | Facility: CLINIC | Age: 29
End: 2019-05-30

## 2019-05-30 NOTE — TELEPHONE ENCOUNTER
New patient appointment with Dr. Slade is scheduled on 6/20/2019 @ 11:00am.    Called and spoke to patient, patient expressed verbal understanding of appointment time.    Sent patient a reminder letter in the mail.

## 2019-05-31 ENCOUNTER — TELEPHONE (OUTPATIENT)
Dept: OBSTETRICS AND GYNECOLOGY | Facility: CLINIC | Age: 29
End: 2019-05-31

## 2019-05-31 LAB
CYTOLOGIST CVX/VAG CYTO: NORMAL
CYTOLOGY CVX/VAG DOC CYTO: NORMAL
CYTOLOGY CVX/VAG DOC THIN PREP: NORMAL
DX ICD CODE: NORMAL
HIV 1 & 2 AB SER-IMP: NORMAL
HPV I/H RISK 4 DNA CVX QL PROBE+SIG AMP: NEGATIVE
OTHER STN SPEC: NORMAL
STAT OF ADQ CVX/VAG CYTO-IMP: NORMAL

## 2019-05-31 NOTE — TELEPHONE ENCOUNTER
Pt aware. IGNACIO      ----- Message from Ty Peralta MD sent at 5/31/2019  9:54 AM EDT -----  Notify patient that her Pap smear was normal and her gonorrhea and Chlamydia tests were negative

## 2021-04-16 ENCOUNTER — BULK ORDERING (OUTPATIENT)
Dept: CASE MANAGEMENT | Facility: OTHER | Age: 31
End: 2021-04-16

## 2021-04-16 DIAGNOSIS — Z23 IMMUNIZATION DUE: ICD-10-CM

## 2021-06-21 ENCOUNTER — LAB REQUISITION (OUTPATIENT)
Dept: LAB | Facility: HOSPITAL | Age: 31
End: 2021-06-21

## 2021-06-21 DIAGNOSIS — T85.79XA INFECTION AND INFLAMMATORY REACTION DUE TO OTHER INTERNAL PROSTHETIC DEVICES, IMPLANTS AND GRAFTS, INITIAL ENCOUNTER (HCC): ICD-10-CM

## 2021-06-21 PROCEDURE — 87015 SPECIMEN INFECT AGNT CONCNTJ: CPT | Performed by: PLASTIC SURGERY

## 2021-06-21 PROCEDURE — 87070 CULTURE OTHR SPECIMN AEROBIC: CPT | Performed by: PLASTIC SURGERY

## 2021-06-21 PROCEDURE — 87186 SC STD MICRODIL/AGAR DIL: CPT

## 2021-06-21 PROCEDURE — 87077 CULTURE AEROBIC IDENTIFY: CPT

## 2021-06-21 PROCEDURE — 87075 CULTR BACTERIA EXCEPT BLOOD: CPT | Performed by: PLASTIC SURGERY

## 2021-06-21 PROCEDURE — 87205 SMEAR GRAM STAIN: CPT | Performed by: PLASTIC SURGERY

## 2021-06-26 LAB — BACTERIA SPEC ANAEROBE CULT: NORMAL

## 2021-07-07 LAB
BACTERIA SPEC AEROBE CULT: ABNORMAL
GRAM STN SPEC: ABNORMAL
GRAM STN SPEC: ABNORMAL

## 2021-10-19 ENCOUNTER — LAB REQUISITION (OUTPATIENT)
Dept: LAB | Facility: HOSPITAL | Age: 31
End: 2021-10-19

## 2021-10-19 DIAGNOSIS — Z00.00 ENCOUNTER FOR GENERAL ADULT MEDICAL EXAMINATION WITHOUT ABNORMAL FINDINGS: ICD-10-CM

## 2021-10-19 PROCEDURE — U0004 COV-19 TEST NON-CDC HGH THRU: HCPCS | Performed by: PLASTIC SURGERY

## 2021-10-20 LAB — SARS-COV-2 ORF1AB RESP QL NAA+PROBE: NOT DETECTED

## 2023-08-30 ENCOUNTER — OFFICE VISIT (OUTPATIENT)
Dept: OBSTETRICS AND GYNECOLOGY | Facility: CLINIC | Age: 33
End: 2023-08-30
Payer: COMMERCIAL

## 2023-08-30 VITALS
DIASTOLIC BLOOD PRESSURE: 80 MMHG | HEIGHT: 63 IN | BODY MASS INDEX: 27.75 KG/M2 | SYSTOLIC BLOOD PRESSURE: 122 MMHG | WEIGHT: 156.6 LBS

## 2023-08-30 DIAGNOSIS — N89.8 VAGINAL DISCHARGE: ICD-10-CM

## 2023-08-30 DIAGNOSIS — N76.0 RECURRENT VAGINITIS: ICD-10-CM

## 2023-08-30 DIAGNOSIS — Z12.4 SCREENING FOR MALIGNANT NEOPLASM OF CERVIX: Primary | ICD-10-CM

## 2023-08-30 DIAGNOSIS — Z01.411 ENCOUNTER FOR GYNECOLOGICAL EXAMINATION WITH ABNORMAL FINDING: ICD-10-CM

## 2023-08-30 DIAGNOSIS — Z11.3 SCREEN FOR SEXUALLY TRANSMITTED DISEASES: ICD-10-CM

## 2023-08-30 RX ORDER — PRAMIPEXOLE DIHYDROCHLORIDE 0.25 MG/1
0.25 TABLET ORAL NIGHTLY PRN
COMMUNITY

## 2023-08-30 RX ORDER — ARIPIPRAZOLE 5 MG/1
5 TABLET ORAL EVERY MORNING
COMMUNITY

## 2023-08-30 RX ORDER — PAROXETINE 30 MG/1
30 TABLET, FILM COATED ORAL
COMMUNITY

## 2023-08-30 RX ORDER — DEXTROAMPHETAMINE SULFATE 20 MG/1
80 TABLET ORAL DAILY
COMMUNITY

## 2023-08-30 RX ORDER — DEXTROAMPHETAMINE SACCHARATE, AMPHETAMINE ASPARTATE, DEXTROAMPHETAMINE SULFATE AND AMPHETAMINE SULFATE 5; 5; 5; 5 MG/1; MG/1; MG/1; MG/1
TABLET ORAL
COMMUNITY
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
## 2023-08-31 LAB
HBV SURFACE AG SERPL QL IA: NEGATIVE
HCV AB SERPL QL IA: NORMAL
HCV IGG SERPL QL IA: NON REACTIVE
HIV 1+2 AB+HIV1 P24 AG SERPL QL IA: NON REACTIVE
RPR SER QL: NON REACTIVE

## 2023-09-03 LAB
A VAGINAE DNA VAG QL NAA+PROBE: ABNORMAL SCORE
BVAB2 DNA VAG QL NAA+PROBE: ABNORMAL SCORE
C ALBICANS DNA VAG QL NAA+PROBE: NEGATIVE
C GLABRATA DNA VAG QL NAA+PROBE: NEGATIVE
C TRACH DNA VAG QL NAA+PROBE: NEGATIVE
M GENITALIUM DNA SPEC QL NAA+PROBE: NEGATIVE
M HOMINIS DNA SPEC QL NAA+PROBE: POSITIVE
MEGA1 DNA VAG QL NAA+PROBE: ABNORMAL SCORE
N GONORRHOEA DNA VAG QL NAA+PROBE: NEGATIVE
T VAGINALIS DNA VAG QL NAA+PROBE: NEGATIVE
UREAPLASMA DNA SPEC QL NAA+PROBE: POSITIVE

## (undated) DEVICE — SUT GUT PLN 0 STD TIE 54IN S104H

## (undated) DEVICE — SUT VIC 3/0 SH 27IN J416H

## (undated) DEVICE — KT ART BLD GAS QUICK DRAW

## (undated) DEVICE — 3M™ MEDIPORE™ H SOFT CLOTH SURGICAL TAPE 2864, 4 INCH X 10 YARD (10CM X 9,14M), 12 ROLLS/CASE: Brand: 3M™ MEDIPORE™

## (undated) DEVICE — SOL IRR H2O BTL 1000ML STRL

## (undated) DEVICE — SUT VIC 4/0 KS 27IN J662H

## (undated) DEVICE — NDL BLNT 18G 1 1/2IN

## (undated) DEVICE — KENDALL SCD EXPRESS SLEEVES, KNEE LENGTH, MEDIUM: Brand: KENDALL SCD

## (undated) DEVICE — SUT PDS 0 CT1 36IN Z346H

## (undated) DEVICE — SUT VIC 3/0 CTI 36IN J944H

## (undated) DEVICE — GLV SURG TRIUMPH CLASSIC PF LTX 6.5 STRL

## (undated) DEVICE — SUT GUT CHRM 0 CT 27IN 914H